# Patient Record
Sex: MALE | Race: WHITE | NOT HISPANIC OR LATINO | Employment: FULL TIME | URBAN - METROPOLITAN AREA
[De-identification: names, ages, dates, MRNs, and addresses within clinical notes are randomized per-mention and may not be internally consistent; named-entity substitution may affect disease eponyms.]

---

## 2023-09-26 ENCOUNTER — LAB (OUTPATIENT)
Dept: LAB | Facility: LAB | Age: 43
End: 2023-09-26
Payer: COMMERCIAL

## 2023-09-26 ENCOUNTER — HOSPITAL ENCOUNTER (EMERGENCY)
Age: 43
Discharge: HOME | End: 2023-09-27
Payer: COMMERCIAL

## 2023-09-26 ENCOUNTER — OFFICE VISIT (OUTPATIENT)
Dept: PRIMARY CARE | Facility: CLINIC | Age: 43
End: 2023-09-26
Payer: COMMERCIAL

## 2023-09-26 VITALS
SYSTOLIC BLOOD PRESSURE: 132 MMHG | BODY MASS INDEX: 35.01 KG/M2 | DIASTOLIC BLOOD PRESSURE: 84 MMHG | HEART RATE: 108 BPM | RESPIRATION RATE: 17 BRPM | WEIGHT: 251 LBS | OXYGEN SATURATION: 97 % | TEMPERATURE: 96.5 F

## 2023-09-26 DIAGNOSIS — E11.65 HYPERGLYCEMIA DUE TO DIABETES MELLITUS (HCC): Primary | ICD-10-CM

## 2023-09-26 DIAGNOSIS — E03.9 HYPOTHYROIDISM, UNSPECIFIED TYPE: ICD-10-CM

## 2023-09-26 DIAGNOSIS — Z00.00 HEALTHCARE MAINTENANCE: ICD-10-CM

## 2023-09-26 DIAGNOSIS — E11.9 TYPE 2 DIABETES MELLITUS WITHOUT COMPLICATION, WITHOUT LONG-TERM CURRENT USE OF INSULIN (MULTI): ICD-10-CM

## 2023-09-26 DIAGNOSIS — E78.2 MIXED HYPERLIPIDEMIA: ICD-10-CM

## 2023-09-26 DIAGNOSIS — I10 HYPERTENSION, UNSPECIFIED TYPE: ICD-10-CM

## 2023-09-26 DIAGNOSIS — B37.0 THRUSH: ICD-10-CM

## 2023-09-26 DIAGNOSIS — Z00.00 HEALTHCARE MAINTENANCE: Primary | ICD-10-CM

## 2023-09-26 PROBLEM — E78.5 HYPERLIPEMIA: Status: ACTIVE | Noted: 2023-09-26

## 2023-09-26 PROBLEM — K76.0 FATTY LIVER: Status: ACTIVE | Noted: 2023-09-26

## 2023-09-26 LAB
ALANINE AMINOTRANSFERASE (SGPT) (U/L) IN SER/PLAS: 107 U/L (ref 10–52)
ALBUMIN (G/DL) IN SER/PLAS: 4.6 G/DL (ref 3.4–5)
ALBUMIN SERPL-MCNC: 3.9 G/DL (ref 3.5–4.6)
ALKALINE PHOSPHATASE (U/L) IN SER/PLAS: 64 U/L (ref 33–120)
ALP SERPL-CCNC: 55 U/L (ref 35–104)
ALT SERPL-CCNC: 75 U/L (ref 0–41)
ANION GAP IN SER/PLAS: 27 MMOL/L (ref 10–20)
ANION GAP SERPL CALCULATED.3IONS-SCNC: 13 MEQ/L (ref 9–15)
ASPARTATE AMINOTRANSFERASE (SGOT) (U/L) IN SER/PLAS: 71 U/L (ref 9–39)
AST SERPL-CCNC: 50 U/L (ref 0–40)
BASOPHILS # BLD: 0 K/UL (ref 0–0.2)
BASOPHILS NFR BLD: 0.2 %
BILIRUB SERPL-MCNC: 0.8 MG/DL (ref 0.2–0.7)
BILIRUB UR QL STRIP: NEGATIVE
BILIRUBIN TOTAL (MG/DL) IN SER/PLAS: 1.8 MG/DL (ref 0–1.2)
BUN SERPL-MCNC: 31 MG/DL (ref 6–20)
CALCIUM (MG/DL) IN SER/PLAS: 10.6 MG/DL (ref 8.6–10.3)
CALCIUM SERPL-MCNC: 8.9 MG/DL (ref 8.5–9.9)
CARBON DIOXIDE, TOTAL (MMOL/L) IN SER/PLAS: 14 MMOL/L (ref 21–32)
CHLORIDE (MMOL/L) IN SER/PLAS: 89 MMOL/L (ref 98–107)
CHLORIDE SERPL-SCNC: 91 MEQ/L (ref 95–107)
CHOLESTEROL (MG/DL) IN SER/PLAS: 323 MG/DL (ref 0–199)
CHOLESTEROL IN HDL (MG/DL) IN SER/PLAS: 32.5 MG/DL
CHOLESTEROL/HDL RATIO: 9.9
CHP ED QC CHECK: NORMAL
CLARITY UR: CLEAR
CO2 SERPL-SCNC: 22 MEQ/L (ref 20–31)
COLOR UR: YELLOW
CREAT SERPL-MCNC: 1.06 MG/DL (ref 0.7–1.2)
CREATININE (MG/DL) IN SER/PLAS: 1.46 MG/DL (ref 0.5–1.3)
EOSINOPHIL # BLD: 0.2 K/UL (ref 0–0.7)
EOSINOPHIL NFR BLD: 1.5 %
ERYTHROCYTE DISTRIBUTION WIDTH (RATIO) BY AUTOMATED COUNT: 12.2 % (ref 11.5–14.5)
ERYTHROCYTE MEAN CORPUSCULAR HEMOGLOBIN CONCENTRATION (G/DL) BY AUTOMATED: 34 G/DL (ref 32–36)
ERYTHROCYTE MEAN CORPUSCULAR VOLUME (FL) BY AUTOMATED COUNT: 91 FL (ref 80–100)
ERYTHROCYTE [DISTWIDTH] IN BLOOD BY AUTOMATED COUNT: 11.9 % (ref 11.5–14.5)
ERYTHROCYTES (10*6/UL) IN BLOOD BY AUTOMATED COUNT: 5.93 X10E12/L (ref 4.5–5.9)
GFR MALE: 60 ML/MIN/1.73M2
GLOBULIN SER CALC-MCNC: 2.5 G/DL (ref 2.3–3.5)
GLUCOSE (MG/DL) IN SER/PLAS: 463 MG/DL (ref 74–99)
GLUCOSE BLD-MCNC: 529 MG/DL
GLUCOSE BLD-MCNC: 529 MG/DL (ref 70–99)
GLUCOSE SERPL-MCNC: 519 MG/DL (ref 70–99)
GLUCOSE UR STRIP-MCNC: >=1000 MG/DL
HCT VFR BLD AUTO: 46.2 % (ref 42–52)
HEMATOCRIT (%) IN BLOOD BY AUTOMATED COUNT: 53.8 % (ref 41–52)
HEMOGLOBIN (G/DL) IN BLOOD: 18.3 G/DL (ref 13.5–17.5)
HGB BLD-MCNC: 16.5 G/DL (ref 14–18)
HGB UR QL STRIP: NEGATIVE
KETONES UR STRIP-MCNC: 15 MG/DL
LDL: ABNORMAL MG/DL (ref 0–99)
LEUKOCYTE ESTERASE UR QL STRIP: NEGATIVE
LEUKOCYTES (10*3/UL) IN BLOOD BY AUTOMATED COUNT: 11.7 X10E9/L (ref 4.4–11.3)
LYMPHOCYTES # BLD: 4.5 K/UL (ref 1–4.8)
LYMPHOCYTES NFR BLD: 45 %
MCH RBC QN AUTO: 31 PG (ref 27–31.3)
MCHC RBC AUTO-ENTMCNC: 35.7 % (ref 33–37)
MCV RBC AUTO: 86.7 FL (ref 79–92.2)
MONOCYTES # BLD: 0.6 K/UL (ref 0.2–0.8)
MONOCYTES NFR BLD: 6.2 %
NEUTROPHILS # BLD: 4.6 K/UL (ref 1.4–6.5)
NEUTS SEG NFR BLD: 46.2 %
NITRITE UR QL STRIP: NEGATIVE
PERFORMED ON: ABNORMAL
PH UR STRIP: 5 [PH] (ref 5–9)
PLATELET # BLD AUTO: 279 K/UL (ref 130–400)
PLATELETS (10*3/UL) IN BLOOD AUTOMATED COUNT: 304 X10E9/L (ref 150–450)
POTASSIUM (MMOL/L) IN SER/PLAS: 5.1 MMOL/L (ref 3.5–5.3)
POTASSIUM SERPL-SCNC: 5.3 MEQ/L (ref 3.4–4.9)
PROT SERPL-MCNC: 6.4 G/DL (ref 6.3–8)
PROT UR STRIP-MCNC: ABNORMAL MG/DL
PROTEIN TOTAL: 7.8 G/DL (ref 6.4–8.2)
RBC # BLD AUTO: 5.33 M/UL (ref 4.7–6.1)
REASON FOR REJECTION: NORMAL
REJECTED TEST: NORMAL
SODIUM (MMOL/L) IN SER/PLAS: 125 MMOL/L (ref 136–145)
SODIUM SERPL-SCNC: 126 MEQ/L (ref 135–144)
SP GR UR STRIP: 1.03 (ref 1–1.03)
THYROTROPIN (MIU/L) IN SER/PLAS BY DETECTION LIMIT <= 0.05 MIU/L: 4.76 MIU/L (ref 0.44–3.98)
THYROXINE (T4) FREE (NG/DL) IN SER/PLAS: 1.21 NG/DL (ref 0.61–1.12)
TRIGLYCERIDE (MG/DL) IN SER/PLAS: 1277 MG/DL (ref 0–149)
UREA NITROGEN (MG/DL) IN SER/PLAS: 29 MG/DL (ref 6–23)
URINE REFLEX TO CULTURE: ABNORMAL
UROBILINOGEN UR STRIP-ACNC: 0.2 E.U./DL
VLDL: ABNORMAL MG/DL (ref 0–40)
WBC # BLD AUTO: 9.9 K/UL (ref 4.8–10.8)

## 2023-09-26 PROCEDURE — 36415 COLL VENOUS BLD VENIPUNCTURE: CPT

## 2023-09-26 PROCEDURE — 3075F SYST BP GE 130 - 139MM HG: CPT | Performed by: FAMILY MEDICINE

## 2023-09-26 PROCEDURE — 84439 ASSAY OF FREE THYROXINE: CPT

## 2023-09-26 PROCEDURE — 85027 COMPLETE CBC AUTOMATED: CPT

## 2023-09-26 PROCEDURE — 96372 THER/PROPH/DIAG INJ SC/IM: CPT

## 2023-09-26 PROCEDURE — 3079F DIAST BP 80-89 MM HG: CPT | Performed by: FAMILY MEDICINE

## 2023-09-26 PROCEDURE — 80053 COMPREHEN METABOLIC PANEL: CPT

## 2023-09-26 PROCEDURE — 99284 EMERGENCY DEPT VISIT MOD MDM: CPT

## 2023-09-26 PROCEDURE — 84443 ASSAY THYROID STIM HORMONE: CPT

## 2023-09-26 PROCEDURE — 99396 PREV VISIT EST AGE 40-64: CPT | Performed by: FAMILY MEDICINE

## 2023-09-26 PROCEDURE — 81003 URINALYSIS AUTO W/O SCOPE: CPT

## 2023-09-26 PROCEDURE — 83721 ASSAY OF BLOOD LIPOPROTEIN: CPT

## 2023-09-26 PROCEDURE — 80061 LIPID PANEL: CPT

## 2023-09-26 PROCEDURE — 2580000003 HC RX 258

## 2023-09-26 PROCEDURE — 85025 COMPLETE CBC W/AUTO DIFF WBC: CPT

## 2023-09-26 PROCEDURE — 4010F ACE/ARB THERAPY RXD/TAKEN: CPT | Performed by: FAMILY MEDICINE

## 2023-09-26 RX ORDER — METFORMIN HYDROCHLORIDE 500 MG/1
500 TABLET ORAL 2 TIMES DAILY
COMMUNITY
End: 2023-10-06 | Stop reason: SDUPTHER

## 2023-09-26 RX ORDER — 0.9 % SODIUM CHLORIDE 0.9 %
2000 INTRAVENOUS SOLUTION INTRAVENOUS ONCE
Status: COMPLETED | OUTPATIENT
Start: 2023-09-26 | End: 2023-09-27

## 2023-09-26 RX ORDER — INSULIN DEGLUDEC 100 U/ML
10 INJECTION, SOLUTION SUBCUTANEOUS NIGHTLY
Qty: 3 ML | Refills: 1 | COMMUNITY
Start: 2023-09-26

## 2023-09-26 RX ORDER — INSULIN LISPRO 100 [IU]/ML
10 INJECTION, SOLUTION INTRAVENOUS; SUBCUTANEOUS ONCE
Status: COMPLETED | OUTPATIENT
Start: 2023-09-26 | End: 2023-09-27

## 2023-09-26 RX ORDER — NYSTATIN 100000 [USP'U]/ML
5 SUSPENSION ORAL 4 TIMES DAILY
Qty: 200 ML | Refills: 0 | Status: SHIPPED | OUTPATIENT
Start: 2023-09-26

## 2023-09-26 RX ORDER — LISINOPRIL 30 MG/1
30 TABLET ORAL DAILY
COMMUNITY
End: 2023-10-06 | Stop reason: SDUPTHER

## 2023-09-26 RX ORDER — INSULIN PUMP SYRINGE, 3 ML
EACH MISCELLANEOUS
Qty: 1 EACH | Refills: 0 | Status: SHIPPED | OUTPATIENT
Start: 2023-09-26 | End: 2024-09-25

## 2023-09-26 RX ORDER — INSULIN LISPRO-AABC 100 [IU]/ML
2 INJECTION, SOLUTION SUBCUTANEOUS 2 TIMES DAILY
Qty: 1 ML | Refills: 0 | COMMUNITY
Start: 2023-09-26 | End: 2023-11-03 | Stop reason: SDUPTHER

## 2023-09-26 RX ADMIN — SODIUM CHLORIDE 2000 ML: 9 INJECTION, SOLUTION INTRAVENOUS at 21:47

## 2023-09-26 ASSESSMENT — ENCOUNTER SYMPTOMS
SHORTNESS OF BREATH: 0
VOMITING: 0
PHOTOPHOBIA: 0
RESPIRATORY NEGATIVE: 1
PSYCHIATRIC NEGATIVE: 1
ABDOMINAL PAIN: 0
DIARRHEA: 0
CARDIOVASCULAR NEGATIVE: 1
COUGH: 0
NUMBNESS: 0
CONSTITUTIONAL NEGATIVE: 1
HEMATOLOGIC/LYMPHATIC NEGATIVE: 1
MUSCULOSKELETAL NEGATIVE: 1
DIZZINESS: 1
GASTROINTESTINAL NEGATIVE: 1
NAUSEA: 0

## 2023-09-26 ASSESSMENT — PAIN - FUNCTIONAL ASSESSMENT: PAIN_FUNCTIONAL_ASSESSMENT: NONE - DENIES PAIN

## 2023-09-26 ASSESSMENT — LIFESTYLE VARIABLES
HOW MANY STANDARD DRINKS CONTAINING ALCOHOL DO YOU HAVE ON A TYPICAL DAY: PATIENT DOES NOT DRINK
HOW OFTEN DO YOU HAVE A DRINK CONTAINING ALCOHOL: NEVER

## 2023-09-26 NOTE — PROGRESS NOTES
Subjective   Patient ID: Carrington eMad is a 43 y.o. male who presents for Follow-up.    Patient states he has had no chest pain or shortness of breath.  No diaphoresis.  He seems to be peeing more.  Normal bowel bladder patient states at times he feels dizzy.  He is lost close to 50 pounds.  He has been watching what he drinks.  Has been avoiding alcohol.  However now if he bends down to tie shoes and stands up he gets a little dizzy.  His blood pressure was running high.  He cut his dose in half.  This is helped with his blood pressures reading.  But he still feels occasional dizziness.  He has not used his CPAP in years.  At times he feels like he may have palpitations.  No leg swelling, his glucose numbers seemed high so he started doubling up his metformin.  He just feels a little more fatigued.  Patient's symptoms have been going on since early August.  He remembers going on a trip to [place] and needing to stop to pee a lot       Review of Systems   Constitutional: Negative.    HENT: Negative.     Respiratory: Negative.     Cardiovascular: Negative.    Gastrointestinal: Negative.    Genitourinary: Negative.    Musculoskeletal: Negative.    Neurological:  Positive for dizziness. Negative for numbness.   Hematological: Negative.    Psychiatric/Behavioral: Negative.         Objective   /84 (BP Location: Left arm, Patient Position: Sitting, BP Cuff Size: Large adult)   Pulse 108   Temp 35.8 °C (96.5 °F)   Resp 17   Wt 114 kg (251 lb)   SpO2 97%   BMI 35.01 kg/m²     Physical Exam  Vitals and nursing note reviewed.   Constitutional:       General: He is not in acute distress.     Appearance: Normal appearance. He is not ill-appearing.   HENT:      Head: Normocephalic.      Right Ear: Tympanic membrane and ear canal normal.      Left Ear: Tympanic membrane and ear canal normal.      Nose: Nose normal.      Mouth/Throat:      Mouth: Mucous membranes are moist.      Pharynx: Oropharynx is clear.       Comments: Patient has a whitish coating on his tongue.  Possible thrush  Eyes:      Extraocular Movements: Extraocular movements intact.      Conjunctiva/sclera: Conjunctivae normal.      Pupils: Pupils are equal, round, and reactive to light.   Cardiovascular:      Rate and Rhythm: Normal rate and regular rhythm.      Pulses: Normal pulses.   Pulmonary:      Effort: Pulmonary effort is normal. No respiratory distress.      Breath sounds: No wheezing, rhonchi or rales.   Abdominal:      General: Bowel sounds are normal.      Palpations: Abdomen is soft.      Tenderness: There is no guarding.      Hernia: No hernia is present.   Musculoskeletal:         General: Normal range of motion.      Cervical back: Neck supple.      Right lower leg: No edema.      Left lower leg: No edema.   Skin:     General: Skin is warm.      Capillary Refill: Capillary refill takes less than 2 seconds.   Neurological:      General: No focal deficit present.      Mental Status: He is oriented to person, place, and time.      Cranial Nerves: No cranial nerve deficit.      Sensory: No sensory deficit.      Gait: Gait normal.      Deep Tendon Reflexes: Reflexes normal.   Psychiatric:         Mood and Affect: Mood normal.         Behavior: Behavior normal.         Judgment: Judgment normal.         Assessment/Plan   Problem List Items Addressed This Visit       Hypertension    Hyperlipemia    Hypothyroidism    Relevant Orders    TSH with reflex to Free T4 if abnormal     Other Visit Diagnoses       Healthcare maintenance    -  Primary    Relevant Orders    CBC    Comprehensive Metabolic Panel    Lipid Panel          Patient's EKG showed normal sinus rhythm.  He did have PVCs.  However patient's Accu-Chek he was over 400. HGA1c 14.9  [facility]  If he is blood work does not show any acidosis we will have him also start the Farxiga as long as his kidney function is fine.  Patient states he has not checked his glucose in some  time.  He has not been eating well.  Drink a lot of orange juice.  Apple juice.  His kidney function is normal we will add Farxiga.  Still may need injectable.  He does not monitor his glucose over the next few days to let us know results.  I will send a new meter in.  Patient aware of importance to his diet, monitor his glucose.  Demonstrated to the patient how to do the injection.  2 units were given in the office.  We will get the blood work we need to make sure the patient is not in DKA.  Per patient today he does not feel that bad.  He is felt a lot worse.  He has been drinking more fluids.  He states he has been taking his medications so he can continue these.  We will check the blood work.  Patient aware if any chest pain shortness of breath nausea vomiting diarrhea fever or if the numbers stay above 300 he is notify the office.  We will adjust both medications until we have this under control.  Then at that time we may switch it.  Follow-up in 1 month depending on results.  Patient's urine did have a large amount of glucose.  Did have a small amount of ketones however patient had not eaten all day.  We will await the blood work 2000 glucose, some protein and small amount of ketones

## 2023-09-27 VITALS
BODY MASS INDEX: 35.65 KG/M2 | DIASTOLIC BLOOD PRESSURE: 95 MMHG | WEIGHT: 249 LBS | HEIGHT: 70 IN | HEART RATE: 95 BPM | TEMPERATURE: 98.9 F | OXYGEN SATURATION: 97 % | RESPIRATION RATE: 18 BRPM | SYSTOLIC BLOOD PRESSURE: 129 MMHG

## 2023-09-27 LAB
CHOLESTEROL IN LDL (MG/DL) IN SER/PLAS BY DIRECT ASSAY: 100 MG/DL (ref 0–129)
CHP ED QC CHECK: NORMAL
GLUCOSE BLD-MCNC: 425 MG/DL
GLUCOSE BLD-MCNC: 425 MG/DL (ref 70–99)
PERFORMED ON: ABNORMAL

## 2023-09-27 PROCEDURE — 96372 THER/PROPH/DIAG INJ SC/IM: CPT

## 2023-09-27 PROCEDURE — 6370000000 HC RX 637 (ALT 250 FOR IP)

## 2023-09-27 RX ADMIN — INSULIN LISPRO 10 UNITS: 100 INJECTION, SOLUTION INTRAVENOUS; SUBCUTANEOUS at 00:01

## 2023-09-27 NOTE — ED PROVIDER NOTES
[facility] ED  EMERGENCY DEPARTMENT ENCOUNTER      Pt Name: Abril Reynolds  MRN: [id number]  [address] [facility] [place] 1980  Date of evaluation: 9/26/2023  Provider: ROSA M Beth    CHIEF COMPLAINT       Chief Complaint   Patient presents with    Hyperglycemia     Patient states he has been feeling ill for approx 1mo. Blood sugar has been running >400mg/dL. Tonight BGL >5oomg/dL. Sent by PCP. HISTORY OF PRESENT ILLNESS   (Location/Symptom, Timing/Onset, Context/Setting, Quality, Duration, Modifying Factors, Severity)  Note limiting factors. Abril Reynolds is a 39 y.o. male who presents to the emergency department patient PMHx T2Dm, pt presents to the ED for evaluation of elevated blood sugar. This was noticed at his doctor's office earlier today. States his blood sugar reading there was over 500. Patient states he has history of diabetes he is typically on 500 mg metformin twice daily. Admits he does not check his sugar daily before today he is not sure when the last time his blood sugar was checked. Patient states he has been feeling very thirsty, losing weight, having frequent urination, for the past 2 months. Patient states while he was in the office today his doctor started him on a second diabetes medicine he is not sure what it was. His A1c there was 14. Patient denies any history of DKA complications. Denies any recent nausea, vomiting, decreased oral intake. Admits to poor dietary compliance and frequent fast food. HPI    Nursing Notes were reviewed. REVIEW OF SYSTEMS    (2-9 systems for level 4, 10 or more for level 5)     Review of Systems   Constitutional:  Positive for unexpected weight change. Negative for chills and fever. HENT:  Negative for congestion. Eyes:  Negative for photophobia. Respiratory:  Negative for cough and shortness of breath. Cardiovascular:  Negative for chest pain.    Gastrointestinal:  Negative for abdominal pain, diarrhea, nausea

## 2023-09-27 NOTE — RESULT ENCOUNTER NOTE
Patient has seen the results.  He actually is at the emergency room to get fluids treatment for his hyponatremia.  He will follow-up to discuss his cholesterol

## 2023-10-03 ENCOUNTER — APPOINTMENT (OUTPATIENT)
Dept: PRIMARY CARE | Facility: CLINIC | Age: 43
End: 2023-10-03
Payer: COMMERCIAL

## 2023-10-06 DIAGNOSIS — E11.9 TYPE 2 DIABETES MELLITUS WITHOUT COMPLICATION, WITHOUT LONG-TERM CURRENT USE OF INSULIN (MULTI): Primary | ICD-10-CM

## 2023-10-06 DIAGNOSIS — I10 HYPERTENSION, UNSPECIFIED TYPE: ICD-10-CM

## 2023-10-06 RX ORDER — LISINOPRIL 30 MG/1
30 TABLET ORAL DAILY
Qty: 30 TABLET | Refills: 3 | Status: SHIPPED | OUTPATIENT
Start: 2023-10-06 | End: 2024-01-30

## 2023-10-06 RX ORDER — METFORMIN HYDROCHLORIDE 1000 MG/1
1000 TABLET ORAL 2 TIMES DAILY
Qty: 60 TABLET | Refills: 3 | Status: SHIPPED | OUTPATIENT
Start: 2023-10-06 | End: 2024-02-09

## 2023-10-13 ENCOUNTER — OFFICE VISIT (OUTPATIENT)
Dept: PRIMARY CARE | Facility: CLINIC | Age: 43
End: 2023-10-13
Payer: COMMERCIAL

## 2023-10-13 VITALS
BODY MASS INDEX: 36.26 KG/M2 | HEART RATE: 77 BPM | RESPIRATION RATE: 17 BRPM | DIASTOLIC BLOOD PRESSURE: 78 MMHG | TEMPERATURE: 97.6 F | WEIGHT: 260 LBS | SYSTOLIC BLOOD PRESSURE: 122 MMHG | OXYGEN SATURATION: 96 %

## 2023-10-13 DIAGNOSIS — I10 HYPERTENSION, UNSPECIFIED TYPE: Primary | ICD-10-CM

## 2023-10-13 DIAGNOSIS — E11.9 TYPE 2 DIABETES MELLITUS WITHOUT COMPLICATION, WITHOUT LONG-TERM CURRENT USE OF INSULIN (MULTI): ICD-10-CM

## 2023-10-13 PROCEDURE — 3074F SYST BP LT 130 MM HG: CPT | Performed by: FAMILY MEDICINE

## 2023-10-13 PROCEDURE — 99213 OFFICE O/P EST LOW 20 MIN: CPT | Performed by: FAMILY MEDICINE

## 2023-10-13 PROCEDURE — 3078F DIAST BP <80 MM HG: CPT | Performed by: FAMILY MEDICINE

## 2023-10-13 PROCEDURE — 4010F ACE/ARB THERAPY RXD/TAKEN: CPT | Performed by: FAMILY MEDICINE

## 2023-10-13 ASSESSMENT — ENCOUNTER SYMPTOMS
CONSTITUTIONAL NEGATIVE: 1
CARDIOVASCULAR NEGATIVE: 1
RESPIRATORY NEGATIVE: 1
GASTROINTESTINAL NEGATIVE: 1

## 2023-10-13 NOTE — PROGRESS NOTES
PatientSubjective   Patient ID: Carrington Mead is a 43 y.o. male who presents for Follow-up.    Patient is doing a lot better.  He feels a lot better.  No chest pain or shortness of breath.  He is tolerating his medications.  He was taking his Crestor but not every day.  He show start taking the Livalo daily.  Patient glucose this morning was 180.  Yesterday after dinner is 142.  He is doing 14 units in the evening of the Tresiba, and 2 units with meals.  He is can adjust the short acting to 2 to 4 units.  We will increase the Tresiba by 2 units on Sunday.  Patient is hydrating well.  He did put on weight but he is starting to try to increase his physical activity         Review of Systems   Constitutional: Negative.    Respiratory: Negative.     Cardiovascular: Negative.    Gastrointestinal: Negative.    Genitourinary: Negative.        Objective   /78 (BP Location: Left arm, Patient Position: Sitting, BP Cuff Size: Large adult)   Pulse 77   Temp 36.4 °C (97.6 °F)   Resp 17   Wt 118 kg (260 lb)   SpO2 96%   BMI 36.26 kg/m²     Physical Exam  Constitutional:       Appearance: Normal appearance.   Cardiovascular:      Rate and Rhythm: Normal rate and regular rhythm.   Pulmonary:      Effort: Pulmonary effort is normal.      Breath sounds: Normal breath sounds.   Neurological:      Mental Status: He is alert.      Sensory: No sensory deficit.         Assessment/Plan   Problem List Items Addressed This Visit       Hypertension - Primary     We will continue medication.  Patient is going to continue to update us with his numbers.  We will restart the Livalo.  We will recheck his sodium and creatinine.  Patient is aware if his numbers are increasing, any concerning symptoms such as chest pain or shortness of breath, any numbness tingling notify the office or go to the ER  Follow-up in 2 months

## 2023-11-03 DIAGNOSIS — E11.9 TYPE 2 DIABETES MELLITUS WITHOUT COMPLICATION, WITHOUT LONG-TERM CURRENT USE OF INSULIN (MULTI): ICD-10-CM

## 2023-11-03 RX ORDER — INSULIN LISPRO 100 [IU]/ML
4 INJECTION, SOLUTION INTRAVENOUS; SUBCUTANEOUS
Qty: 10 ML | Refills: 2 | Status: SHIPPED | OUTPATIENT
Start: 2023-11-03 | End: 2023-11-07

## 2023-11-07 DIAGNOSIS — E11.9 TYPE 2 DIABETES MELLITUS WITHOUT COMPLICATION, WITHOUT LONG-TERM CURRENT USE OF INSULIN (MULTI): Primary | ICD-10-CM

## 2023-11-07 RX ORDER — INSULIN LISPRO 200 [IU]/ML
4 INJECTION, SOLUTION SUBCUTANEOUS 2 TIMES DAILY
Qty: 1 EACH | Refills: 2 | Status: SHIPPED | OUTPATIENT
Start: 2023-11-07

## 2024-01-05 ENCOUNTER — TELEPHONE (OUTPATIENT)
Dept: PRIMARY CARE | Facility: CLINIC | Age: 44
End: 2024-01-05
Payer: COMMERCIAL

## 2024-01-30 DIAGNOSIS — I10 HYPERTENSION, UNSPECIFIED TYPE: ICD-10-CM

## 2024-01-30 RX ORDER — LISINOPRIL 30 MG/1
30 TABLET ORAL DAILY
Qty: 30 TABLET | Refills: 3 | Status: SHIPPED | OUTPATIENT
Start: 2024-01-30 | End: 2024-02-09 | Stop reason: SDUPTHER

## 2024-02-08 DIAGNOSIS — Z00.00 HEALTHCARE MAINTENANCE: ICD-10-CM

## 2024-02-08 DIAGNOSIS — E11.9 TYPE 2 DIABETES MELLITUS WITHOUT COMPLICATION, WITHOUT LONG-TERM CURRENT USE OF INSULIN (MULTI): ICD-10-CM

## 2024-02-09 DIAGNOSIS — I10 HYPERTENSION, UNSPECIFIED TYPE: ICD-10-CM

## 2024-02-09 RX ORDER — METFORMIN HYDROCHLORIDE 1000 MG/1
1000 TABLET ORAL 2 TIMES DAILY
Qty: 60 TABLET | Refills: 5 | Status: SHIPPED | OUTPATIENT
Start: 2024-02-09

## 2024-02-09 RX ORDER — LISINOPRIL 30 MG/1
30 TABLET ORAL DAILY
Qty: 30 TABLET | Refills: 5 | Status: SHIPPED | OUTPATIENT
Start: 2024-02-09

## 2024-02-09 RX ORDER — LEVOTHYROXINE SODIUM 125 UG/1
TABLET ORAL
Qty: 30 TABLET | Refills: 5 | Status: SHIPPED | OUTPATIENT
Start: 2024-02-09

## 2024-07-24 DIAGNOSIS — E11.9 TYPE 2 DIABETES MELLITUS WITHOUT COMPLICATION, WITHOUT LONG-TERM CURRENT USE OF INSULIN (MULTI): Primary | ICD-10-CM

## 2024-08-02 DIAGNOSIS — Z00.00 HEALTHCARE MAINTENANCE: ICD-10-CM

## 2024-08-03 RX ORDER — LEVOTHYROXINE SODIUM 125 UG/1
TABLET ORAL
Qty: 90 TABLET | Refills: 1 | Status: SHIPPED | OUTPATIENT
Start: 2024-08-03

## 2024-08-07 DIAGNOSIS — E11.9 TYPE 2 DIABETES MELLITUS WITHOUT COMPLICATION, WITHOUT LONG-TERM CURRENT USE OF INSULIN (MULTI): ICD-10-CM

## 2024-08-08 RX ORDER — METFORMIN HYDROCHLORIDE 1000 MG/1
1000 TABLET ORAL 2 TIMES DAILY
Qty: 60 TABLET | Refills: 3 | Status: SHIPPED | OUTPATIENT
Start: 2024-08-08

## 2024-08-15 DIAGNOSIS — E11.9 TYPE 2 DIABETES MELLITUS WITHOUT COMPLICATION, WITHOUT LONG-TERM CURRENT USE OF INSULIN (MULTI): ICD-10-CM

## 2024-08-15 DIAGNOSIS — Z00.00 HEALTHCARE MAINTENANCE: Primary | ICD-10-CM

## 2024-08-16 ENCOUNTER — LAB (OUTPATIENT)
Dept: LAB | Facility: LAB | Age: 44
End: 2024-08-16
Payer: COMMERCIAL

## 2024-08-16 DIAGNOSIS — Z00.00 HEALTHCARE MAINTENANCE: ICD-10-CM

## 2024-08-16 DIAGNOSIS — E11.9 TYPE 2 DIABETES MELLITUS WITHOUT COMPLICATION, WITHOUT LONG-TERM CURRENT USE OF INSULIN (MULTI): ICD-10-CM

## 2024-08-16 LAB
ALBUMIN SERPL BCP-MCNC: 4.5 G/DL (ref 3.4–5)
ALP SERPL-CCNC: 51 U/L (ref 33–120)
ALT SERPL W P-5'-P-CCNC: 124 U/L (ref 10–52)
ANION GAP SERPL CALC-SCNC: 14 MMOL/L (ref 10–20)
AST SERPL W P-5'-P-CCNC: 76 U/L (ref 9–39)
BILIRUB SERPL-MCNC: 1.8 MG/DL (ref 0–1.2)
BUN SERPL-MCNC: 22 MG/DL (ref 6–23)
CALCIUM SERPL-MCNC: 10.1 MG/DL (ref 8.6–10.3)
CHLORIDE SERPL-SCNC: 103 MMOL/L (ref 98–107)
CHOLEST SERPL-MCNC: 199 MG/DL (ref 0–199)
CHOLESTEROL/HDL RATIO: 5.1
CO2 SERPL-SCNC: 24 MMOL/L (ref 21–32)
CREAT SERPL-MCNC: 1.08 MG/DL (ref 0.5–1.3)
EGFRCR SERPLBLD CKD-EPI 2021: 86 ML/MIN/1.73M*2
ERYTHROCYTE [DISTWIDTH] IN BLOOD BY AUTOMATED COUNT: 12.2 % (ref 11.5–14.5)
EST. AVERAGE GLUCOSE BLD GHB EST-MCNC: 180 MG/DL
GLUCOSE SERPL-MCNC: 166 MG/DL (ref 74–99)
HBA1C MFR BLD: 7.9 %
HCT VFR BLD AUTO: 46.9 % (ref 41–52)
HDLC SERPL-MCNC: 39.1 MG/DL
HGB BLD-MCNC: 16.5 G/DL (ref 13.5–17.5)
LDLC SERPL CALC-MCNC: ABNORMAL MG/DL
MCH RBC QN AUTO: 31.1 PG (ref 26–34)
MCHC RBC AUTO-ENTMCNC: 35.2 G/DL (ref 32–36)
MCV RBC AUTO: 89 FL (ref 80–100)
NON HDL CHOLESTEROL: 160 MG/DL (ref 0–149)
NRBC BLD-RTO: 0 /100 WBCS (ref 0–0)
PLATELET # BLD AUTO: 236 X10*3/UL (ref 150–450)
POTASSIUM SERPL-SCNC: 4.4 MMOL/L (ref 3.5–5.3)
PROT SERPL-MCNC: 7.3 G/DL (ref 6.4–8.2)
RBC # BLD AUTO: 5.3 X10*6/UL (ref 4.5–5.9)
SODIUM SERPL-SCNC: 137 MMOL/L (ref 136–145)
TRIGL SERPL-MCNC: 419 MG/DL (ref 0–149)
VLDL: ABNORMAL
WBC # BLD AUTO: 8.3 X10*3/UL (ref 4.4–11.3)

## 2024-08-16 PROCEDURE — 36415 COLL VENOUS BLD VENIPUNCTURE: CPT

## 2024-08-16 PROCEDURE — 83036 HEMOGLOBIN GLYCOSYLATED A1C: CPT

## 2024-08-16 PROCEDURE — 80053 COMPREHEN METABOLIC PANEL: CPT

## 2024-08-16 PROCEDURE — 85027 COMPLETE CBC AUTOMATED: CPT

## 2024-08-16 PROCEDURE — 80061 LIPID PANEL: CPT

## 2024-08-17 DIAGNOSIS — E11.9 TYPE 2 DIABETES MELLITUS WITHOUT COMPLICATION, WITHOUT LONG-TERM CURRENT USE OF INSULIN (MULTI): ICD-10-CM

## 2024-08-17 RX ORDER — INSULIN DEGLUDEC 100 U/ML
10 INJECTION, SOLUTION SUBCUTANEOUS NIGHTLY
Qty: 3 ML | Refills: 1 | Status: SHIPPED | OUTPATIENT
Start: 2024-08-17

## 2024-08-19 DIAGNOSIS — E11.9 TYPE 2 DIABETES MELLITUS WITHOUT COMPLICATION, WITHOUT LONG-TERM CURRENT USE OF INSULIN (MULTI): Primary | ICD-10-CM

## 2024-08-19 RX ORDER — INSULIN GLARGINE 100 [IU]/ML
10 INJECTION, SOLUTION SUBCUTANEOUS NIGHTLY
Qty: 1 EACH | Refills: 2 | Status: SHIPPED | OUTPATIENT
Start: 2024-08-19

## 2024-08-27 ENCOUNTER — OFFICE VISIT (OUTPATIENT)
Dept: PRIMARY CARE | Facility: CLINIC | Age: 44
End: 2024-08-27
Payer: COMMERCIAL

## 2024-08-27 VITALS
TEMPERATURE: 97 F | SYSTOLIC BLOOD PRESSURE: 152 MMHG | DIASTOLIC BLOOD PRESSURE: 110 MMHG | BODY MASS INDEX: 38.78 KG/M2 | WEIGHT: 277 LBS | OXYGEN SATURATION: 97 % | RESPIRATION RATE: 16 BRPM | HEIGHT: 71 IN | HEART RATE: 72 BPM

## 2024-08-27 DIAGNOSIS — E11.9 TYPE 2 DIABETES MELLITUS WITHOUT COMPLICATION, WITHOUT LONG-TERM CURRENT USE OF INSULIN (MULTI): Primary | ICD-10-CM

## 2024-08-27 DIAGNOSIS — E78.2 MIXED HYPERLIPIDEMIA: ICD-10-CM

## 2024-08-27 DIAGNOSIS — I10 HYPERTENSION, UNSPECIFIED TYPE: ICD-10-CM

## 2024-08-27 PROCEDURE — 1036F TOBACCO NON-USER: CPT | Performed by: FAMILY MEDICINE

## 2024-08-27 PROCEDURE — 3080F DIAST BP >= 90 MM HG: CPT | Performed by: FAMILY MEDICINE

## 2024-08-27 PROCEDURE — 3051F HG A1C>EQUAL 7.0%<8.0%: CPT | Performed by: FAMILY MEDICINE

## 2024-08-27 PROCEDURE — 3008F BODY MASS INDEX DOCD: CPT | Performed by: FAMILY MEDICINE

## 2024-08-27 PROCEDURE — 3077F SYST BP >= 140 MM HG: CPT | Performed by: FAMILY MEDICINE

## 2024-08-27 PROCEDURE — 4010F ACE/ARB THERAPY RXD/TAKEN: CPT | Performed by: FAMILY MEDICINE

## 2024-08-27 PROCEDURE — 99213 OFFICE O/P EST LOW 20 MIN: CPT | Performed by: FAMILY MEDICINE

## 2024-08-27 RX ORDER — LISINOPRIL 40 MG/1
40 TABLET ORAL DAILY
Qty: 90 TABLET | Refills: 1 | Status: SHIPPED | OUTPATIENT
Start: 2024-08-27

## 2024-08-27 RX ORDER — PITAVASTATIN CALCIUM 2.09 MG/1
2 TABLET, FILM COATED ORAL DAILY
Qty: 30 TABLET | Refills: 2 | Status: SHIPPED | OUTPATIENT
Start: 2024-08-27

## 2024-08-27 RX ORDER — INSULIN GLARGINE-YFGN 100 [IU]/ML
10 INJECTION, SOLUTION SUBCUTANEOUS NIGHTLY
COMMUNITY
Start: 2024-08-19

## 2024-08-27 RX ORDER — INSULIN LISPRO 200 [IU]/ML
4 INJECTION, SOLUTION SUBCUTANEOUS 2 TIMES DAILY
Qty: 1 EACH | Refills: 2 | Status: SHIPPED | OUTPATIENT
Start: 2024-08-27

## 2024-08-27 ASSESSMENT — ENCOUNTER SYMPTOMS
CARDIOVASCULAR NEGATIVE: 1
CONSTITUTIONAL NEGATIVE: 1
GASTROINTESTINAL NEGATIVE: 1
RESPIRATORY NEGATIVE: 1

## 2024-08-27 NOTE — PROGRESS NOTES
"Subjective   Patient ID: Carrington Mead is a 44 y.o. male who presents for Follow-up.    Patient is having no chest pain or shortness of breath.  He is taken no long-acting insulin and metformin.  He did not take the short acting.  This was working better he was losing weight.  Discussed Mounjaro with the patient and he wants to try this in October.  Patient stated that his numbers are probably little high.  His blood pressures been little high.  He has not been eating well or drinking well.  He has maintained his weight.  No leg swelling, no side effects of medication         Review of Systems   Constitutional: Negative.    Respiratory: Negative.     Cardiovascular: Negative.    Gastrointestinal: Negative.    Genitourinary: Negative.        Objective   BP (!) 152/110 (BP Location: Right arm, Patient Position: Sitting, BP Cuff Size: Large adult)   Pulse 72   Temp 36.1 °C (97 °F)   Resp 16   Ht 1.803 m (5' 11\")   Wt 126 kg (277 lb)   SpO2 97%   BMI 38.63 kg/m²     Physical Exam  Vitals reviewed.   Constitutional:       General: He is not in acute distress.     Appearance: Normal appearance. He is well-developed.   HENT:      Head: Normocephalic.   Cardiovascular:      Rate and Rhythm: Normal rate and regular rhythm.      Heart sounds: Normal heart sounds.   Pulmonary:      Effort: Pulmonary effort is normal.      Breath sounds: Normal breath sounds.   Skin:     Findings: No rash.   Neurological:      Mental Status: He is alert.   Psychiatric:         Mood and Affect: Mood normal.         Behavior: Behavior normal.         Assessment/Plan   Problem List Items Addressed This Visit       Hyperlipemia    Type 2 diabetes mellitus (Multi) - Primary     Patient is to speak with [facility] pharmacy to see if they can help him.  Will try short acting along with long-acting.  When he is to start the Mounjaro we will stop this.  Patient blood pressure still running high.  Would increase his lisinopril.  He needs to watch his " diet try to get physical activity in.  May need to add hydrochlorothiazide.  He is can check his blood pressure over the next few days  Went over the results.  Patient's liver enzymes are still elevated.  He has a history of fatty liver.  No fever should get an ultrasound.  He he does not want to do this at this time.  May need to see GI.  Patient aware if any chest pain shortness of breath any nausea vomiting diarrhea fever headache any concerning symptoms go to ER  Follow-up in 3 months.

## 2024-09-04 DIAGNOSIS — E11.9 TYPE 2 DIABETES MELLITUS WITHOUT COMPLICATION, WITHOUT LONG-TERM CURRENT USE OF INSULIN (MULTI): Primary | ICD-10-CM

## 2024-09-04 RX ORDER — INSULIN ASPART 100 [IU]/ML
2 INJECTION, SOLUTION INTRAVENOUS; SUBCUTANEOUS
Qty: 3 ML | Refills: 1 | Status: SHIPPED | OUTPATIENT
Start: 2024-09-04

## 2024-09-10 ENCOUNTER — APPOINTMENT (OUTPATIENT)
Dept: PHARMACY | Facility: HOSPITAL | Age: 44
End: 2024-09-10
Payer: COMMERCIAL

## 2024-09-10 DIAGNOSIS — E11.9 TYPE 2 DIABETES MELLITUS WITHOUT COMPLICATION, WITHOUT LONG-TERM CURRENT USE OF INSULIN (MULTI): ICD-10-CM

## 2024-09-10 RX ORDER — TIRZEPATIDE 5 MG/.5ML
5 INJECTION, SOLUTION SUBCUTANEOUS
Qty: 2 ML | Refills: 0 | Status: SHIPPED | OUTPATIENT
Start: 2024-09-10

## 2024-09-10 NOTE — PROGRESS NOTES
Subjective   Patient ID: Carrington Mead is a 44 y.o. male who presents for Follow-up (Mounjaro help). Starting mounjaro at end of month; has sample in hand. Unable to run test claim at  at this time. Does not qualify for PAP at .         Assessment/Plan   Problem List Items Addressed This Visit       Type 2 diabetes mellitus (Multi)    Relevant Medications    tirzepatide (Mounjaro) 5 mg/0.5 mL pen injector    Other Relevant Orders    Follow Up In [facility] - Pharmacy       LAB RESULTS:  Lab Results   Component Value Date    HGBA1C 7.9 (H) 08/16/2024     Lab Results   Component Value Date    LDLCALC  08/16/2024      Comment:      The calculation of LDL and VLDL are inaccurate when the Triglycerides are greater than 400 mg/dL or when the patient is non-fasting. If LDL measurement is necessary contact the testing laboratory for an alternative LDL assay.                                  Near   Borderline      AGE      Desirable  Optimal    High     High     Very High     0-19 Y     0 - 109     ---    110-129   >/= 130     ----    20-24 Y     0 - 119     ---    120-159   >/= 160     ----      >24 Y     0 -  99   100-129  130-159   160-189     >/=190      CREATININE 1.08 08/16/2024      Lab Results   Component Value Date    CHOL 199 08/16/2024    CHOL 323 (H) 09/26/2023     Lab Results   Component Value Date    HDL 39.1 08/16/2024    HDL 32.5 (A) 09/26/2023       Lab Results   Component Value Date    LDLCALC  08/16/2024      Comment:      The calculation of LDL and VLDL are inaccurate when the Triglycerides are greater than 400 mg/dL or when the patient is non-fasting. If LDL measurement is necessary contact the testing laboratory for an alternative LDL assay.                                  Near   Borderline      AGE      Desirable  Optimal    High     High     Very High     0-19 Y     0 - 109     ---    110-129   >/= 130     ----    20-24 Y     0 - 119     ---    120-159   >/= 160     ----      >24 Y      0 -  99   100-129  130-159   160-189     >/=190       Lab Results   Component Value Date    TRIG 419 (H) 08/16/2024    TRIG 1277 (H) 09/26/2023       Continue all meds under the continuation of care with the referring provider and clinical pharmacy team.    Start Mounjaro 2.5 mg at end of month. Will look to cross-titrate with insulins with goal of Mounjaro monotherapy for DMII. Rx for 5 mg send to patient home pharmacy to see if covered as [employer] test[employer] software not working at this time.

## 2024-10-15 ENCOUNTER — APPOINTMENT (OUTPATIENT)
Dept: PHARMACY | Facility: HOSPITAL | Age: 44
End: 2024-10-15
Payer: COMMERCIAL

## 2024-10-15 DIAGNOSIS — E11.9 TYPE 2 DIABETES MELLITUS WITHOUT COMPLICATION, WITHOUT LONG-TERM CURRENT USE OF INSULIN (MULTI): ICD-10-CM

## 2024-10-15 NOTE — PROGRESS NOTES
Subjective     Patient ID: Carrington Mead is a 44 y.o. male who presents for Follow-up (DMII). Unable to run test claim as ins plan appears not to be contracted with  pharmacies. Contacted patient home pharmacy to verify insurance info and verify that Mounjaro needs PA for approval. Patient verbalized understanding.     Referring Provider: Ronan Montano, DO       No Known Allergies    Objective     Current DM Pharmacotherapy:   Novolog 2 units 2 times daily before meals  Lantus 10 units nightly  Humalog 4 units 2 times daily  Metformin 1,000 mg BID  Mounjaro 2.5 mg weekly (samples)    SECONDARY PREVENTION  - Statin? Yes  - ACE-I/ARB? Yes  - Aspirin? No      Pertinent PMH Review:  - PMH of Pancreatitis: No  - PMH/FH of Medullary Thyroid Cancer: No  - PMH/FH of Multiple Endocrine Neoplasia (MEN) Type II: No  - PMH of Retinopathy: No  - PMH of Urinary Tract Infections/Yeast Infections: No    Patient Goals  - Fasting B - 130 mg/dL  - Postprandial BG: less than 180 mg/dL  - A1c less than 7%    Lab Review  Lab Results   Component Value Date    BILITOT 1.8 (H) 2024    CALCIUM 10.1 2024    CO2 24 2024     2024    CREATININE 1.08 2024    GLUCOSE 166 (H) 2024    ALKPHOS 51 2024    K 4.4 2024    PROT 7.3 2024     2024    AST 76 (H) 2024     (H) 2024    BUN 22 2024    ANIONGAP 14 2024    ALBUMIN 4.5 2024    GFRMALE 60 2023     Lab Results   Component Value Date    TRIG 419 (H) 2024    CHOL 199 2024    LDLCALC  2024      Comment:      The calculation of LDL and VLDL are inaccurate when the Triglycerides are greater than 400 mg/dL or when the patient is non-fasting. If LDL measurement is necessary contact the testing laboratory for an alternative LDL assay.                                  Near   Borderline      AGE      Desirable  Optimal    High     High     Very High     0-19 Y     0 -  "109     ---    110-129   >/= 130     ----    20-24 Y     0 - 119     ---    120-159   >/= 160     ----      >24 Y     0 -  99   100-129  130-159   160-189     >/=190      HDL 39.1 08/16/2024     Lab Results   Component Value Date    HGBA1C 7.9 (H) 08/16/2024     No components found for: \"UACR\"  The 10-year ASCVD risk score (Floyd CARRANZA, et al., 2019) is: 8.2%    Values used to calculate the score:      Age: 44 years      Sex: Male      Is Non- : No      Diabetic: Yes      Tobacco smoker: No      Systolic Blood Pressure: 152 mmHg      Is BP treated: Yes      HDL Cholesterol: 39.1 mg/dL      Total Cholesterol: 199 mg/dL      Assessment/Plan     Problem List Items Addressed This Visit       Type 2 diabetes mellitus    Relevant Orders    Referral to Clinical Pharmacy       Type 2 diabetes mellitus, is not at goal. Goal A1C: <7%    Follow up: I recommend diabetes care be 1 weeks.  Pa Submitted; Share PA determination at follow-up for Mounjaro 5 mg weekly; to discuss cost assistance, if needed at that time    Mark Clemente PharmD [facility]  Clinical Pharmacy Specialist, Primary Care     Continue all meds under the continuation of care with the referring provider and clinic    "

## 2024-10-22 ENCOUNTER — APPOINTMENT (OUTPATIENT)
Dept: PHARMACY | Facility: HOSPITAL | Age: 44
End: 2024-10-22
Payer: COMMERCIAL

## 2024-10-22 DIAGNOSIS — E11.9 TYPE 2 DIABETES MELLITUS WITHOUT COMPLICATION, WITHOUT LONG-TERM CURRENT USE OF INSULIN (MULTI): ICD-10-CM

## 2024-10-22 NOTE — PROGRESS NOTES
Subjective   Patient ID: Carrington Mead is a 44 y.o. male who presents for Follow-up (PA determination). Tolerating Mounjaro 2.5 mg weekly well; vomiting once after eating greasy food but has not has issues since. Down 11 lbs since starting. Has not used insulin recently. No BG's to report today. Last A1c not at goal.     Assessment/Plan   Problem List Items Addressed This Visit       Type 2 diabetes mellitus       LAB RESULTS:  Lab Results   Component Value Date    HGBA1C 7.9 (H) 08/16/2024     Lab Results   Component Value Date    LDLCALC  08/16/2024      Comment:      The calculation of LDL and VLDL are inaccurate when the Triglycerides are greater than 400 mg/dL or when the patient is non-fasting. If LDL measurement is necessary contact the testing laboratory for an alternative LDL assay.                                  Near   Borderline      AGE      Desirable  Optimal    High     High     Very High     0-19 Y     0 - 109     ---    110-129   >/= 130     ----    20-24 Y     0 - 119     ---    120-159   >/= 160     ----      >24 Y     0 -  99   100-129  130-159   160-189     >/=190      CREATININE 1.08 08/16/2024      Lab Results   Component Value Date    CHOL 199 08/16/2024    CHOL 323 (H) 09/26/2023     Lab Results   Component Value Date    HDL 39.1 08/16/2024    HDL 32.5 (A) 09/26/2023       Lab Results   Component Value Date    LDLCALC  08/16/2024      Comment:      The calculation of LDL and VLDL are inaccurate when the Triglycerides are greater than 400 mg/dL or when the patient is non-fasting. If LDL measurement is necessary contact the testing laboratory for an alternative LDL assay.                                  Near   Borderline      AGE      Desirable  Optimal    High     High     Very High     0-19 Y     0 - 109     ---    110-129   >/= 130     ----    20-24 Y     0 - 119     ---    120-159   >/= 160     ----      >24 Y     0 -  99   100-129  130-159   160-189     >/=190       Lab Results    Component Value Date    TRIG 419 (H) 08/16/2024    TRIG 1277 (H) 09/26/2023       Continue all meds under the continuation of care with the referring provider and clinical pharmacy team.    PA approved and copay at $25 per pharmacy technician at patient pharmacy. Patient to start Mounjaro 5 mg weekly and follow-up in 4 weeks. Discontinue Insulins upon starting 5 mg weekly; pt to call this writer for any need prior to follow-up

## 2024-11-19 ENCOUNTER — APPOINTMENT (OUTPATIENT)
Dept: PHARMACY | Facility: HOSPITAL | Age: 44
End: 2024-11-19
Payer: COMMERCIAL

## 2024-11-19 DIAGNOSIS — E11.9 TYPE 2 DIABETES MELLITUS WITHOUT COMPLICATION, WITHOUT LONG-TERM CURRENT USE OF INSULIN (MULTI): ICD-10-CM

## 2024-11-19 RX ORDER — TIRZEPATIDE 7.5 MG/.5ML
7.5 INJECTION, SOLUTION SUBCUTANEOUS
Qty: 2 ML | Refills: 0 | Status: CANCELLED | OUTPATIENT
Start: 2024-11-19

## 2024-11-19 RX ORDER — TIRZEPATIDE 5 MG/.5ML
5 INJECTION, SOLUTION SUBCUTANEOUS
Qty: 2 ML | Refills: 6 | Status: SHIPPED | OUTPATIENT
Start: 2024-11-19

## 2024-11-19 NOTE — PROGRESS NOTES
Subjective     Patient ID: Carrington Mead is a 44 y.o. male who presents for Diabetes. Tolerating well has minor nausea intermittently if he doesn't eat. Sometimes get nausea after what he eats is a poor choice.      Referring Provider: Ronan Montano, DO     HPI    Diet: Caloric Restriction; smaller portions with meals; still eating out a lot while on road.     Exercise: limited since follow-up  Current Weight: 253 lbs  Baseline Weight: 277 lbs  Goal Weight: 220 lbs (highschool weight)    No Known Allergies    Objective     Current DM Pharmacotherapy:   Mounjaro 5 mg weekly    SECONDARY PREVENTION  - Statin? Yes  - ACE-I/ARB? Yes  - Aspirin? No    Pertinent PMH Review:  - PMH of Pancreatitis: No  - PMH/FH of Medullary Thyroid Cancer: No  - PMH/FH of Multiple Endocrine Neoplasia (MEN) Type II: No  - PMH of Retinopathy: No  - PMH of Urinary Tract Infections/Yeast Infections: No    Patient Goals  - Fasting B - 130 mg/dL  - Postprandial BG: less than 180 mg/dL  - A1c less than 7%    Lab Review  Lab Results   Component Value Date    BILITOT 1.8 (H) 2024    CALCIUM 10.1 2024    CO2 24 2024     2024    CREATININE 1.08 2024    GLUCOSE 166 (H) 2024    ALKPHOS 51 2024    K 4.4 2024    PROT 7.3 2024     2024    AST 76 (H) 2024     (H) 2024    BUN 22 2024    ANIONGAP 14 2024    ALBUMIN 4.5 2024    GFRMALE 60 2023     Lab Results   Component Value Date    TRIG 419 (H) 2024    CHOL 199 2024    LDLCALC  2024      Comment:      The calculation of LDL and VLDL are inaccurate when the Triglycerides are greater than 400 mg/dL or when the patient is non-fasting. If LDL measurement is necessary contact the testing laboratory for an alternative LDL assay.                                  Near   Borderline      AGE      Desirable  Optimal    High     High     Very High     0-19 Y     0 - 109     ---     110-129   >/= 130     ----    20-24 Y     0 - 119     ---    120-159   >/= 160     ----      >24 Y     0 -  99   100-129  130-159   160-189     >/=190      HDL 39.1 08/16/2024     Lab Results   Component Value Date    HGBA1C 7.9 (H) 08/16/2024       The 10-year ASCVD risk score (Floyd CARRANZA, et al., 2019) is: 8.2%    Values used to calculate the score:      Age: 44 years      Sex: Male      Is Non- : No      Diabetic: Yes      Tobacco smoker: No      Systolic Blood Pressure: 152 mmHg      Is BP treated: Yes      HDL Cholesterol: 39.1 mg/dL      Total Cholesterol: 199 mg/dL      Assessment/Plan     Problem List Items Addressed This Visit       Type 2 diabetes mellitus       Type 2 diabetes mellitus, is not at goal. Goal A1C: <7%    Follow up: I recommend diabetes care be 12 weeks. 2/11/24 @ 3:30 p  2.   Losing weight within target range and has minor nausea ; will continue for another 12 week of Mounjaro  5 mg weekly  3.   Follow diet/exercise plan as directed  3.   Monitor weight and BG as directed    Mark Clemente PharmD [facility]  Clinical Pharmacy Specialist, Primary Care     Continue all meds under the continuation of care with the referring provider and clinic

## 2024-12-07 DIAGNOSIS — E11.9 TYPE 2 DIABETES MELLITUS WITHOUT COMPLICATION, WITHOUT LONG-TERM CURRENT USE OF INSULIN (MULTI): ICD-10-CM

## 2024-12-07 DIAGNOSIS — E78.2 MIXED HYPERLIPIDEMIA: ICD-10-CM

## 2024-12-09 RX ORDER — PITAVASTATIN CALCIUM 2.09 MG/1
1 TABLET, FILM COATED ORAL DAILY
Qty: 30 TABLET | Refills: 3 | Status: SHIPPED | OUTPATIENT
Start: 2024-12-09

## 2024-12-09 RX ORDER — METFORMIN HYDROCHLORIDE 1000 MG/1
1000 TABLET ORAL 2 TIMES DAILY
Qty: 60 TABLET | Refills: 3 | Status: SHIPPED | OUTPATIENT
Start: 2024-12-09

## 2024-12-24 DIAGNOSIS — Z00.00 HEALTHCARE MAINTENANCE: Primary | ICD-10-CM

## 2024-12-24 DIAGNOSIS — Z12.11 ENCOUNTER FOR SCREENING FOR MALIGNANT NEOPLASM OF COLON: ICD-10-CM

## 2025-01-13 ENCOUNTER — TELEPHONE (OUTPATIENT)
Dept: PHARMACY | Facility: HOSPITAL | Age: 45
End: 2025-01-13
Payer: COMMERCIAL

## 2025-01-13 ENCOUNTER — PHARMACY VISIT (OUTPATIENT)
Dept: PHARMACY | Facility: CLINIC | Age: 45
End: 2025-01-13
Payer: COMMERCIAL

## 2025-01-13 DIAGNOSIS — E11.9 TYPE 2 DIABETES MELLITUS WITHOUT COMPLICATION, WITHOUT LONG-TERM CURRENT USE OF INSULIN (MULTI): Primary | ICD-10-CM

## 2025-01-13 DIAGNOSIS — E11.9 TYPE 2 DIABETES MELLITUS WITHOUT COMPLICATION, WITHOUT LONG-TERM CURRENT USE OF INSULIN (MULTI): ICD-10-CM

## 2025-01-13 PROCEDURE — RXMED WILLOW AMBULATORY MEDICATION CHARGE

## 2025-01-13 RX ORDER — TIRZEPATIDE 5 MG/.5ML
5 INJECTION, SOLUTION SUBCUTANEOUS
Qty: 2 ML | Refills: 2 | Status: SHIPPED | OUTPATIENT
Start: 2025-01-13

## 2025-01-13 RX ORDER — TIRZEPATIDE 5 MG/.5ML
5 INJECTION, SOLUTION SUBCUTANEOUS
Qty: 2 ML | Refills: 2 | Status: SHIPPED | OUTPATIENT
Start: 2025-01-13 | End: 2025-01-13 | Stop reason: SDUPTHER

## 2025-01-13 NOTE — TELEPHONE ENCOUNTER
Reorder Mounjaro 5 mg to [facility]. Patient is out of medication (next dose due 1/15) & leaving for trip (1/21). Coupon savings apply to deductible.    Problem List Items Addressed This Visit       Type 2 diabetes mellitus - Primary    Relevant Medications    tirzepatide (Mounjaro) 5 mg/0.5 mL pen injector

## 2025-01-28 DIAGNOSIS — Z00.00 HEALTHCARE MAINTENANCE: ICD-10-CM

## 2025-01-28 RX ORDER — LEVOTHYROXINE SODIUM 125 UG/1
TABLET ORAL
Qty: 90 TABLET | Refills: 2 | Status: SHIPPED | OUTPATIENT
Start: 2025-01-28

## 2025-02-03 PROCEDURE — RXMED WILLOW AMBULATORY MEDICATION CHARGE

## 2025-02-05 ENCOUNTER — PHARMACY VISIT (OUTPATIENT)
Dept: PHARMACY | Facility: CLINIC | Age: 45
End: 2025-02-05
Payer: COMMERCIAL

## 2025-02-05 PROCEDURE — RXMED WILLOW AMBULATORY MEDICATION CHARGE

## 2025-02-08 ENCOUNTER — PHARMACY VISIT (OUTPATIENT)
Dept: PHARMACY | Facility: CLINIC | Age: 45
End: 2025-02-08
Payer: COMMERCIAL

## 2025-02-11 ENCOUNTER — APPOINTMENT (OUTPATIENT)
Dept: PHARMACY | Facility: HOSPITAL | Age: 45
End: 2025-02-11
Payer: COMMERCIAL

## 2025-02-11 DIAGNOSIS — E11.9 TYPE 2 DIABETES MELLITUS WITHOUT COMPLICATION, WITHOUT LONG-TERM CURRENT USE OF INSULIN (MULTI): ICD-10-CM

## 2025-02-11 RX ORDER — TIRZEPATIDE 5 MG/.5ML
5 INJECTION, SOLUTION SUBCUTANEOUS
Qty: 2 ML | Refills: 2 | Status: SHIPPED | OUTPATIENT
Start: 2025-02-11

## 2025-02-11 NOTE — PATIENT INSTRUCTIONS
Eating tips to reduce nausea and unwanted GI side effects from Ozempic and Mounjaro   Eat slowly  Eat smaller portions  Avoid laying down right after meal  Stop eating when feeling full  Avoid drinking from straw  Stay hydrated, drink small amounts of water throughout the day  Avoid strenuous exercise after eating  Avoid eating close too bedtime    Meal planning tips  Avoid fried foods, and high fat meals  Focus on bland foods  Choose water rich foods like soups, kefir, protein drinks/smoothies     Lifestyle suggestions  Keep a food/symptom diary, as it may be helpful to identify meal timing of foods that make nausea worse.  Engage in light exercise (walking), get fresh air    Additional tips for alleviating nausea:  Wait at least 30 minutes after GLP1-RA dose to eat, if feeling nauseated try crackers, apples, mint, omar root or omar tea  Avoid strong smells    If vomiting:  Eat smaller amounts of food in more frequent meals  Stay hydrated, but avoid drinks during meals, wait 30-60 minutes before and after meals to have liquids    If having diarrhea:  Generous hydration, i.e: water with lemon and tsp baking soda  Avoid dairy products, laxatives, coffee, alcoholic beverages, soft drinks, very cold or very hot foods, products that contain sugar alcohols (sorbitol, mannitol, xylitol, maltitol), including gum and candy  Avoid (or temporarily reduce your intake of) foods with high fiber content such as grain and seed products, whole grain breads, artichokes, asparagus, beans, cabbage, lentils, mushrooms, onions, garlic, sugar snap peas, skinned fruits, apples, apricots, blackberries, cherries, cody, nectarine, pears, plum  Eat chicken broth, rice, carrots, very ripe fruit without skin  Gradually increase fiber back in when symptoms improve    If having constipation:  Ensure the amount of fiber in diet is adequate (goal is minimum of 25 grams per day).  Increase physical activity  Drink generous amounts of  water

## 2025-02-11 NOTE — PROGRESS NOTES
Pharmacist Clinic: Weight Management    Carrington Mead was referred to the Clinical Pharmacy Team for weight management. Patient endorses constipation for 1-2 days after injection and then turns to diarrhea. Reports this is non severe like when he took Ozempic. Says he feels the best he has felt in a long time.     Referring Provider: Ronan Montano, DO  Problem List Items Addressed This Visit       Type 2 diabetes mellitus       HISTORY OF PRESENT ILLNESS    Diet: no changes - not watching what he eats but eating smaller portions - notices he is able to eat a bit more lately.     Exercise Routine: none and not interested at this time.     Current Weight:  246 lbs    BG's 100-120 mg/dL    PHARMACY ASSESSMENT    Allergies: Patient has no known allergies.     [facility] [address] [place], [place] - [address]  [address] [facility] [address]  Phone: [phone] Fax: [phone]    [facility]  [address]  [facility] [address]  Phone: [phone] Fax: [phone]      No issues reported in regards to accessibility, affordability, adherence, or organization    CURRENT PHARMACOTHERAPY  - Mounjaro 5 mg weekly  - Metformin 1,000 mg BID     DRUG INTERACTIONS  - No significant drug-drug interactions exist that require adjustment to therapy    LAB  Lab Results   Component Value Date    BILITOT 1.8 (H) 08/16/2024    CALCIUM 10.1 08/16/2024    CO2 24 08/16/2024     08/16/2024    CREATININE 1.08 08/16/2024    GLUCOSE 166 (H) 08/16/2024    ALKPHOS 51 08/16/2024    K 4.4 08/16/2024    PROT 7.3 08/16/2024     08/16/2024    AST 76 (H) 08/16/2024     (H) 08/16/2024    BUN 22 08/16/2024    ANIONGAP 14 08/16/2024    ALBUMIN 4.5 08/16/2024    EGFR 86 08/16/2024     Lab Results   Component Value Date    TRIG 419 (H) 08/16/2024    CHOL 199 08/16/2024    LDLCALC  08/16/2024      Comment:      The calculation of LDL and VLDL are inaccurate when the Triglycerides are greater than  400 mg/dL or when the patient is non-fasting. If LDL measurement is necessary contact the testing laboratory for an alternative LDL assay.                                  Near   Borderline      AGE      Desirable  Optimal    High     High     Very High     0-19 Y     0 - 109     ---    110-129   >/= 130     ----    20-24 Y     0 - 119     ---    120-159   >/= 160     ----      >24 Y     0 -  99   100-129  130-159   160-189     >/=190      HDL 39.1 08/16/2024     Lab Results   Component Value Date    HGBA1C 7.9 (H) 08/16/2024       Current Outpatient Medications on File Prior to Visit   Medication Sig Dispense Refill    levothyroxine (Synthroid, Levoxyl) 125 mcg tablet Take 1 tablet by mouth once daily 90 tablet 2    lisinopril 40 mg tablet Take 1 tablet (40 mg) by mouth once daily. 90 tablet 1    metFORMIN (Glucophage) 1,000 mg tablet Take 1 tablet by mouth twice daily 60 tablet 1    nystatin (Mycostatin) 100,000 unit/mL suspension Take 5 mL (500,000 Units) by mouth 4 times a day. Swish in mouth and swallow. (Patient not taking: Reported on 8/27/2024) 200 mL 0    pitavastatin calcium 2 mg tablet Take 1 tablet by mouth once daily 30 tablet 1    tirzepatide (Mounjaro) 5 mg/0.5 mL pen injector Inject 5 mg under the skin every 7 days. 2 mL 2     No current facility-administered medications on file prior to visit.       PATIENT EDUCATION/GOALS  - Eating tips to reduce nausea and unwanted GI side effects from Ozempic and Mounjaro   Eat slowly  Eat smaller portions  Avoid laying down right after meal  Stop eating when feeling full  Avoid drinking from straw  Stay hydrated, drink small amounts of water throughout the day  Avoid strenuous exercise after eating  Avoid eating close too bedtime    Meal planning tips  Avoid fried foods, and high fat meals  Focus on bland foods  Choose water rich foods like soups, kefir, protein drinks/smoothies     Lifestyle suggestions  Keep a food/symptom diary, as it may be helpful to  identify meal timing of foods that make nausea worse.  Engage in light exercise (walking), get fresh air    Additional tips for alleviating nausea:  Wait at least 30 minutes after GLP1-RA dose to eat, if feeling nauseated try crackers, apples, mint, omar root or omar tea  Avoid strong smells    If vomiting:  Eat smaller amounts of food in more frequent meals  Stay hydrated, but avoid drinks during meals, wait 30-60 minutes before and after meals to have liquids    If having diarrhea:  Generous hydration, i.e: water with lemon and tsp baking soda  Avoid dairy products, laxatives, coffee, alcoholic beverages, soft drinks, very cold or very hot foods, products that contain sugar alcohols (sorbitol, mannitol, xylitol, maltitol), including gum and candy  Avoid (or temporarily reduce your intake of) foods with high fiber content such as grain and seed products, whole grain breads, artichokes, asparagus, beans, cabbage, lentils, mushrooms, onions, garlic, sugar snap peas, skinned fruits, apples, apricots, blackberries, cherries, cody, nectarine, pears, plum  Eat chicken broth, rice, carrots, very ripe fruit without skin  Gradually increase fiber back in when symptoms improve    If having constipation:  Ensure the amount of fiber in diet is adequate (goal is minimum of 25 grams per day).  Increase physical activity  Drink generous amounts of water    Mounjaro Education:     Counseled patient on Mounjaro MOA, expectations, side effects, duration of therapy, administration, and monitoring parameters.  Provided detailed dosing and administration counseling to ensure proper technique.   Reviewed Mounjaro titration schedule, starting with 2.5 mg once weekly to a goal of 15 mg once weekly if tolerated  Counseled patient on the benefits of GLP-1ra glycemic control and weight loss  Reviewed storage requirements of Mounjaro when not in use, and when to administer the medication if a dose is missed.  Advised patient that they  may experience improved satiety after meals and portion sizes of meals may be reduced as doses of Mounjaro increase.      - Counseled patient on MOA, expectations, side effects, duration of therapy, contraindications, administration, and monitoring parameters  - Answered all patient questions and concerns; provided [facility] phone number if issues/questions arise    RECOMMENDATIONS/PLAN  1. Continue Mounjaro 5 mg weekly  2. Cut metformin to 1,000 mg daily  3. Consider increase at follow-up --will see if weight loss continues and patient tolerating dose better.     Clinical Pharmacist follow up: 4 weeks 3/11/25 @ 3:20  Type of Encounter: telephone    Continue all meds under the continuation of care with the referring provider and clinical pharmacy team.    Thank you,  Mark Clemente, PharmD     Verbal consent to manage patient's drug therapy was obtained from patient. They were informed they may decline to participate or withdraw from participation in pharmacy services at any time.

## 2025-02-27 PROCEDURE — RXMED WILLOW AMBULATORY MEDICATION CHARGE

## 2025-03-03 ENCOUNTER — PHARMACY VISIT (OUTPATIENT)
Dept: PHARMACY | Facility: CLINIC | Age: 45
End: 2025-03-03
Payer: COMMERCIAL

## 2025-03-11 ENCOUNTER — APPOINTMENT (OUTPATIENT)
Dept: PHARMACY | Facility: HOSPITAL | Age: 45
End: 2025-03-11
Payer: COMMERCIAL

## 2025-03-11 DIAGNOSIS — E11.9 TYPE 2 DIABETES MELLITUS WITHOUT COMPLICATION, WITHOUT LONG-TERM CURRENT USE OF INSULIN (MULTI): ICD-10-CM

## 2025-03-11 NOTE — PROGRESS NOTES
Pharmacist Clinic: Weight Management     Carrington Mead was referred to the Clinical Pharmacy Team for weight management. Patient endorses continued constipation for 1-2 days after injection and then turns to diarrhea; although improved-not as much stomach cramps - cut meformin in half at last visit. Reports this is non severe like when he took Ozempic. Continues to feel good overall.      Referring Provider: Ronan Montano, DO  Problem List Items Addressed This Visit         Type 2 diabetes mellitus         HISTORY OF PRESENT ILLNESS     Diet: no changes - not watching what he eats but eating smaller portions - notices he is able to eat a bit more lately.      Exercise Routine: none and not interested at this time.      Current Weight:      2/11/25: 246 lbs     BG's 100-120 mg/dL     PHARMACY ASSESSMENT     Allergies: Patient has no known allergies.      [facility] [address] [place], [place] - [address]  [address] [facility] [address]  Phone: [phone] Fax: [phone]     [facility]  [address]  [facility] [address]  Phone: [phone] Fax: [phone]        No issues reported in regards to accessibility, affordability, adherence, or organization     CURRENT PHARMACOTHERAPY  - Mounjaro 5 mg weekly  - Metformin 1,000 mg daily      DRUG INTERACTIONS  - No significant drug-drug interactions exist that require adjustment to therapy     LAB        Lab Results   Component Value Date     BILITOT 1.8 (H) 08/16/2024     CALCIUM 10.1 08/16/2024     CO2 24 08/16/2024      08/16/2024     CREATININE 1.08 08/16/2024     GLUCOSE 166 (H) 08/16/2024     ALKPHOS 51 08/16/2024     K 4.4 08/16/2024     PROT 7.3 08/16/2024      08/16/2024     AST 76 (H) 08/16/2024      (H) 08/16/2024     BUN 22 08/16/2024     ANIONGAP 14 08/16/2024     ALBUMIN 4.5 08/16/2024     EGFR 86 08/16/2024              Lab Results   Component Value Date     TRIG 419 (H) 08/16/2024     CHOL 199  08/16/2024     LDLCALC   08/16/2024         Comment:         The calculation of LDL and VLDL are inaccurate when the Triglycerides are greater than 400 mg/dL or when the patient is non-fasting. If LDL measurement is necessary contact the testing laboratory for an alternative LDL assay.                                     Near   Borderline      AGE      Desirable  Optimal    High     High     Very High     0-19 Y     0 - 109     ---    110-129   >/= 130     ----    20-24 Y     0 - 119     ---    120-159   >/= 160     ----      >24 Y     0 -  99   100-129  130-159   160-189     >/=190        HDL 39.1 08/16/2024            Lab Results   Component Value Date     HGBA1C 7.9 (H) 08/16/2024         Medications Ordered Prior to Encounter          Current Outpatient Medications on File Prior to Visit   Medication Sig Dispense Refill    levothyroxine (Synthroid, Levoxyl) 125 mcg tablet Take 1 tablet by mouth once daily 90 tablet 2    lisinopril 40 mg tablet Take 1 tablet (40 mg) by mouth once daily. 90 tablet 1    metFORMIN (Glucophage) 1,000 mg tablet Take 1 tablet by mouth twice daily 60 tablet 1    nystatin (Mycostatin) 100,000 unit/mL suspension Take 5 mL (500,000 Units) by mouth 4 times a day. Swish in mouth and swallow. (Patient not taking: Reported on 8/27/2024) 200 mL 0    pitavastatin calcium 2 mg tablet Take 1 tablet by mouth once daily 30 tablet 1    tirzepatide (Mounjaro) 5 mg/0.5 mL pen injector Inject 5 mg under the skin every 7 days. 2 mL 2      No current facility-administered medications on file prior to visit.            PATIENT EDUCATION/GOALS  - Eating tips to reduce nausea and unwanted GI side effects from Ozempic and Mounjaro   Eat slowly  Eat smaller portions  Avoid laying down right after meal  Stop eating when feeling full  Avoid drinking from straw  Stay hydrated, drink small amounts of water throughout the day  Avoid strenuous exercise after eating  Avoid eating close too bedtime     Meal planning  tips  Avoid fried foods, and high fat meals  Focus on bland foods  Choose water rich foods like soups, kefir, protein drinks/smoothies      Lifestyle suggestions  Keep a food/symptom diary, as it may be helpful to identify meal timing of foods that make nausea worse.  Engage in light exercise (walking), get fresh air     Additional tips for alleviating nausea:  Wait at least 30 minutes after GLP1-RA dose to eat, if feeling nauseated try crackers, apples, mint, omar root or omar tea  Avoid strong smells     If vomiting:  Eat smaller amounts of food in more frequent meals  Stay hydrated, but avoid drinks during meals, wait 30-60 minutes before and after meals to have liquids     If having diarrhea:  Generous hydration, i.e: water with lemon and tsp baking soda  Avoid dairy products, laxatives, coffee, alcoholic beverages, soft drinks, very cold or very hot foods, products that contain sugar alcohols (sorbitol, mannitol, xylitol, maltitol), including gum and candy  Avoid (or temporarily reduce your intake of) foods with high fiber content such as grain and seed products, whole grain breads, artichokes, asparagus, beans, cabbage, lentils, mushrooms, onions, garlic, sugar snap peas, skinned fruits, apples, apricots, blackberries, cherries, cody, nectarine, pears, plum  Eat chicken broth, rice, carrots, very ripe fruit without skin  Gradually increase fiber back in when symptoms improve     If having constipation:  Ensure the amount of fiber in diet is adequate (goal is minimum of 25 grams per day).  Increase physical activity  Drink generous amounts of water     Mounjaro Education:      Counseled patient on Mounjaro MOA, expectations, side effects, duration of therapy, administration, and monitoring parameters.  Provided detailed dosing and administration counseling to ensure proper technique.   Reviewed Mounjaro titration schedule, starting with 2.5 mg once weekly to a goal of 15 mg once weekly if  tolerated  Counseled patient on the benefits of GLP-1ra glycemic control and weight loss  Reviewed storage requirements of Mounjaro when not in use, and when to administer the medication if a dose is missed.  Advised patient that they may experience improved satiety after meals and portion sizes of meals may be reduced as doses of Mounjaro increase.        - Counseled patient on MOA, expectations, side effects, duration of therapy, contraindications, administration, and monitoring parameters  - Answered all patient questions and concerns; provided [facility] phone number if issues/questions arise     RECOMMENDATIONS/PLAN  1. Continue Mounjaro to  5 mg weekly  2. discontinue metformin to 1,000 mg daily  3. Implement more fiber to diet along with high protein intake; exercise as tolerated.      Clinical Pharmacist follow up: 4 weeks 4/8/25 @ 3:20p  Type of Encounter: telephone     Continue all meds under the continuation of care with the referring provider and clinical pharmacy team.     Thank you,  Mark Clemente, PharmD      Verbal consent to manage patient's drug therapy was obtained from patient. They were informed they may decline to participate or withdraw from participation in pharmacy services at any time.

## 2025-03-17 PROCEDURE — RXMED WILLOW AMBULATORY MEDICATION CHARGE

## 2025-03-20 ENCOUNTER — PHARMACY VISIT (OUTPATIENT)
Dept: PHARMACY | Facility: CLINIC | Age: 45
End: 2025-03-20
Payer: COMMERCIAL

## 2025-03-26 PROCEDURE — RXMED WILLOW AMBULATORY MEDICATION CHARGE

## 2025-04-02 ENCOUNTER — PHARMACY VISIT (OUTPATIENT)
Dept: PHARMACY | Facility: CLINIC | Age: 45
End: 2025-04-02
Payer: COMMERCIAL

## 2025-04-08 ENCOUNTER — APPOINTMENT (OUTPATIENT)
Dept: PHARMACY | Facility: HOSPITAL | Age: 45
End: 2025-04-08
Payer: COMMERCIAL

## 2025-04-14 DIAGNOSIS — I10 HYPERTENSION, UNSPECIFIED TYPE: ICD-10-CM

## 2025-04-14 PROCEDURE — RXMED WILLOW AMBULATORY MEDICATION CHARGE

## 2025-04-15 PROCEDURE — RXMED WILLOW AMBULATORY MEDICATION CHARGE

## 2025-04-15 RX ORDER — LISINOPRIL 40 MG/1
40 TABLET ORAL DAILY
Qty: 90 TABLET | Refills: 1 | Status: SHIPPED | OUTPATIENT
Start: 2025-04-15

## 2025-04-17 ENCOUNTER — APPOINTMENT (OUTPATIENT)
Dept: PHARMACY | Facility: HOSPITAL | Age: 45
End: 2025-04-17
Payer: COMMERCIAL

## 2025-04-17 ENCOUNTER — PHARMACY VISIT (OUTPATIENT)
Dept: PHARMACY | Facility: CLINIC | Age: 45
End: 2025-04-17
Payer: COMMERCIAL

## 2025-04-17 DIAGNOSIS — E11.9 TYPE 2 DIABETES MELLITUS WITHOUT COMPLICATION, WITHOUT LONG-TERM CURRENT USE OF INSULIN: ICD-10-CM

## 2025-04-17 PROCEDURE — RXMED WILLOW AMBULATORY MEDICATION CHARGE

## 2025-04-17 RX ORDER — TIRZEPATIDE 5 MG/.5ML
5 INJECTION, SOLUTION SUBCUTANEOUS
Qty: 2 ML | Refills: 2 | Status: SHIPPED | OUTPATIENT
Start: 2025-04-17

## 2025-04-17 NOTE — PROGRESS NOTES
Pharmacist Clinic: Weight Management    Carrington Mead was referred to the Clinical Pharmacy Team for weight management. Better toleration of Mounjaro; side effects when eating poorly. Has been traveling a lot (6 of last 8 weeks) and has impacted lifestyle and food choices. Patient would like to lose more weight and feels he is at a plateau.    Referring Provider: Ronan Montano, DO  Problem List Items Addressed This Visit    None      HISTORY OF PRESENT ILLNESS    Diet: no changes - not watching what he eats but eating smaller portions - notices he is able to eat a bit more lately.      Exercise Routine: none and not interested at this time.      Current Weight:  none to report ; patient says no weight loss     2/11/25: 246 lbs     BG's 100-120 mg/dL    PHARMACY ASSESSMENT    Allergies: Patient has no known allergies.     [facility] [address] [place] [address] [address] [facility]  [address] [facility] [address]  Phone: [phone] Fax: [phone]    [facility]  [address] [address]  Phone: [phone] Fax: [phone]    No issues reported in regards to [accessibility, affordability, adherence, adverse effects, or organization]    CURRENT PHARMACOTHERAPY  - Mounjaro 5 mg weekly     DRUG INTERACTIONS  - No significant drug-drug interactions exist that require adjustment to therapy    LAB  Lab Results   Component Value Date    BILITOT 1.8 (H) 08/16/2024    CALCIUM 10.1 08/16/2024    CO2 24 08/16/2024     08/16/2024    CREATININE 1.08 08/16/2024    GLUCOSE 166 (H) 08/16/2024    ALKPHOS 51 08/16/2024    K 4.4 08/16/2024    PROT 7.3 08/16/2024     08/16/2024    AST 76 (H) 08/16/2024     (H) 08/16/2024    BUN 22 08/16/2024    ANIONGAP 14 08/16/2024    ALBUMIN 4.5 08/16/2024    EGFR 86 08/16/2024     Lab Results   Component Value Date    TRIG 419 (H) 08/16/2024    CHOL 199 08/16/2024    LDLCALC  08/16/2024      Comment:      The calculation of LDL and VLDL  are inaccurate when the Triglycerides are greater than 400 mg/dL or when the patient is non-fasting. If LDL measurement is necessary contact the testing laboratory for an alternative LDL assay.                                  Near   Borderline      AGE      Desirable  Optimal    High     High     Very High     0-19 Y     0 - 109     ---    110-129   >/= 130     ----    20-24 Y     0 - 119     ---    120-159   >/= 160     ----      >24 Y     0 -  99   100-129  130-159   160-189     >/=190      HDL 39.1 08/16/2024     Lab Results   Component Value Date    HGBA1C 7.9 (H) 08/16/2024       Medications Ordered Prior to Encounter[1]    PATIENT EDUCATION/GOALS  - Eating tips to reduce nausea and unwanted GI side effects from Ozempic and Mounjaro   Eat slowly  Eat smaller portions  Avoid laying down right after meal  Stop eating when feeling full  Avoid drinking from straw  Stay hydrated, drink small amounts of water throughout the day  Avoid strenuous exercise after eating  Avoid eating close too bedtime    Meal planning tips  Avoid fried foods, and high fat meals  Focus on bland foods  Choose water rich foods like soups, kefir, protein drinks/smoothies     Lifestyle suggestions  Keep a food/symptom diary, as it may be helpful to identify meal timing of foods that make nausea worse.  Engage in light exercise (walking), get fresh air    Additional tips for alleviating nausea:  Wait at least 30 minutes after GLP1-RA dose to eat, if feeling nauseated try crackers, apples, mint, omar root or omar tea  Avoid strong smells    If vomiting:  Eat smaller amounts of food in more frequent meals  Stay hydrated, but avoid drinks during meals, wait 30-60 minutes before and after meals to have liquids    If having diarrhea:  Generous hydration, i.e: water with lemon and tsp baking soda  Avoid dairy products, laxatives, coffee, alcoholic beverages, soft drinks, very cold or very hot foods, products that contain sugar alcohols  (sorbitol, mannitol, xylitol, maltitol), including gum and candy  Avoid (or temporarily reduce your intake of) foods with high fiber content such as grain and seed products, whole grain breads, artichokes, asparagus, beans, cabbage, lentils, mushrooms, onions, garlic, sugar snap peas, skinned fruits, apples, apricots, blackberries, cherries, cody, nectarine, pears, plum  Eat chicken broth, rice, carrots, very ripe fruit without skin  Gradually increase fiber back in when symptoms improve    If having constipation:  Ensure the amount of fiber in diet is adequate (goal is minimum of 25 grams per day).  Increase physical activity  Drink generous amounts of water         - Counseled patient on MOA, expectations, side effects, duration of therapy, contraindications, administration, and monitoring parameters  - Answered all patient questions and concerns; provided [facility] phone number if issues/questions arise    RECOMMENDATIONS/PLAN  1. Last pcp visit 8 mo prior; follow-up with PCP prior to dose change-patient's says next week; continue Mounjaro 5 mg weekly and metformin 2 gm/day; if A1c and weight appropriate can consider increase in mounjaro and/or decrease in metformin. Indicated to increase with weight but would prefer to see labwork  2. Continue to work towards diet/exercise plan of 500kcal caloric restriction diet with moderate protein, adequate fiber and at least 150 minutes of aerobic exercise weekly  3. Follow-up 3 mo with goal of pt seeing PCP prior to follow-up.     Clinical Pharmacist follow up: 7/24/25 @4pm  Type of Encounter: telephone    Continue all meds under the continuation of care with the referring provider and clinical pharmacy team.    Thank you,  Mark Clemente, PharmD     Verbal consent to manage patient's drug therapy was obtained from patient. They were informed they may decline to participate or withdraw from participation in pharmacy services at any time.                    [1]   Current  Outpatient Medications on File Prior to Visit   Medication Sig Dispense Refill    levothyroxine (Synthroid, Levoxyl) 125 mcg tablet Take 1 tablet by mouth once daily 90 tablet 2    lisinopril 40 mg tablet Take 1 tablet (40 mg) by mouth once daily. 90 tablet 1    metFORMIN (Glucophage) 1,000 mg tablet Take 1 tablet by mouth twice daily 60 tablet 1    nystatin (Mycostatin) 100,000 unit/mL suspension Take 5 mL (500,000 Units) by mouth 4 times a day. Swish in mouth and swallow. (Patient not taking: Reported on 8/27/2024) 200 mL 0    pitavastatin calcium 2 mg tablet Take 1 tablet by mouth once daily 30 tablet 1    tirzepatide (Mounjaro) 5 mg/0.5 mL pen injector Inject 5 mg under the skin every 7 days. 2 mL 2    [DISCONTINUED] lisinopril 40 mg tablet Take 1 tablet (40 mg) by mouth once daily. 90 tablet 1     No current facility-administered medications on file prior to visit.

## 2025-04-23 ENCOUNTER — PHARMACY VISIT (OUTPATIENT)
Dept: PHARMACY | Facility: CLINIC | Age: 45
End: 2025-04-23
Payer: COMMERCIAL

## 2025-04-24 DIAGNOSIS — E11.9 TYPE 2 DIABETES MELLITUS WITHOUT COMPLICATION, WITHOUT LONG-TERM CURRENT USE OF INSULIN: ICD-10-CM

## 2025-04-24 DIAGNOSIS — E03.9 HYPOTHYROIDISM, UNSPECIFIED TYPE: ICD-10-CM

## 2025-04-24 DIAGNOSIS — Z00.00 HEALTHCARE MAINTENANCE: Primary | ICD-10-CM

## 2025-04-24 DIAGNOSIS — I10 HYPERTENSION, UNSPECIFIED TYPE: ICD-10-CM

## 2025-04-24 DIAGNOSIS — Z13.220 LIPID SCREENING: ICD-10-CM

## 2025-04-25 LAB
ALBUMIN SERPL-MCNC: 4.6 G/DL (ref 3.6–5.1)
ALBUMIN/CREAT UR: 1230 MG/G CREAT
ALP SERPL-CCNC: 45 U/L (ref 36–130)
ALT SERPL-CCNC: 71 U/L (ref 9–46)
ANION GAP SERPL CALCULATED.4IONS-SCNC: 12 MMOL/L (CALC) (ref 7–17)
AST SERPL-CCNC: 43 U/L (ref 10–40)
BILIRUB SERPL-MCNC: 1.5 MG/DL (ref 0.2–1.2)
BUN SERPL-MCNC: 20 MG/DL (ref 7–25)
CALCIUM SERPL-MCNC: 10.1 MG/DL (ref 8.6–10.3)
CHLORIDE SERPL-SCNC: 103 MMOL/L (ref 98–110)
CHOLEST SERPL-MCNC: 222 MG/DL
CHOLEST/HDLC SERPL: 4.9 (CALC)
CO2 SERPL-SCNC: 24 MMOL/L (ref 20–32)
CREAT SERPL-MCNC: 1.2 MG/DL (ref 0.6–1.29)
CREAT UR-MCNC: 130 MG/DL (ref 20–320)
EGFRCR SERPLBLD CKD-EPI 2021: 76 ML/MIN/1.73M2
ERYTHROCYTE [DISTWIDTH] IN BLOOD BY AUTOMATED COUNT: 13.1 % (ref 11–15)
EST. AVERAGE GLUCOSE BLD GHB EST-MCNC: 131 MG/DL
EST. AVERAGE GLUCOSE BLD GHB EST-SCNC: 7.3 MMOL/L
GLUCOSE SERPL-MCNC: 106 MG/DL (ref 65–99)
HBA1C MFR BLD: 6.2 %
HCT VFR BLD AUTO: 49.4 % (ref 38.5–50)
HDLC SERPL-MCNC: 45 MG/DL
HGB BLD-MCNC: 16.4 G/DL (ref 13.2–17.1)
LDLC SERPL CALC-MCNC: 133 MG/DL (CALC)
MCH RBC QN AUTO: 30 PG (ref 27–33)
MCHC RBC AUTO-ENTMCNC: 33.2 G/DL (ref 32–36)
MCV RBC AUTO: 90.5 FL (ref 80–100)
MICROALBUMIN UR-MCNC: 159.9 MG/DL
NONHDLC SERPL-MCNC: 177 MG/DL (CALC)
PLATELET # BLD AUTO: 274 THOUSAND/UL (ref 140–400)
PMV BLD REES-ECKER: 9.8 FL (ref 7.5–12.5)
POTASSIUM SERPL-SCNC: 4.2 MMOL/L (ref 3.5–5.3)
PROT SERPL-MCNC: 7.4 G/DL (ref 6.1–8.1)
RBC # BLD AUTO: 5.46 MILLION/UL (ref 4.2–5.8)
SODIUM SERPL-SCNC: 139 MMOL/L (ref 135–146)
TRIGL SERPL-MCNC: 289 MG/DL
TSH SERPL-ACNC: 2.96 MIU/L (ref 0.4–4.5)
WBC # BLD AUTO: 9.4 THOUSAND/UL (ref 3.8–10.8)

## 2025-04-25 NOTE — RESULT ENCOUNTER NOTE
Sg your hemoglobin A1c is 6.2.  This is really good your liver enzymes have came down  We can discuss your cholesterol at your visit  The blood counts look good thyroid test is in the normal range so continue the medications

## 2025-05-13 ENCOUNTER — APPOINTMENT (OUTPATIENT)
Dept: PRIMARY CARE | Facility: CLINIC | Age: 45
End: 2025-05-13
Payer: COMMERCIAL

## 2025-05-13 VITALS
DIASTOLIC BLOOD PRESSURE: 108 MMHG | TEMPERATURE: 97.7 F | SYSTOLIC BLOOD PRESSURE: 152 MMHG | WEIGHT: 255 LBS | HEART RATE: 92 BPM | BODY MASS INDEX: 35.57 KG/M2 | OXYGEN SATURATION: 96 % | RESPIRATION RATE: 16 BRPM

## 2025-05-13 DIAGNOSIS — E11.9 TYPE 2 DIABETES MELLITUS WITHOUT COMPLICATION, WITHOUT LONG-TERM CURRENT USE OF INSULIN: ICD-10-CM

## 2025-05-13 DIAGNOSIS — I10 PRIMARY HYPERTENSION: ICD-10-CM

## 2025-05-13 DIAGNOSIS — E78.2 MIXED HYPERLIPIDEMIA: ICD-10-CM

## 2025-05-13 DIAGNOSIS — Z00.00 HEALTHCARE MAINTENANCE: Primary | ICD-10-CM

## 2025-05-13 PROCEDURE — 99396 PREV VISIT EST AGE 40-64: CPT | Performed by: FAMILY MEDICINE

## 2025-05-13 PROCEDURE — RXMED WILLOW AMBULATORY MEDICATION CHARGE

## 2025-05-13 PROCEDURE — 3080F DIAST BP >= 90 MM HG: CPT | Performed by: FAMILY MEDICINE

## 2025-05-13 PROCEDURE — 4010F ACE/ARB THERAPY RXD/TAKEN: CPT | Performed by: FAMILY MEDICINE

## 2025-05-13 PROCEDURE — 3077F SYST BP >= 140 MM HG: CPT | Performed by: FAMILY MEDICINE

## 2025-05-13 RX ORDER — AMLODIPINE BESYLATE 2.5 MG/1
2.5 TABLET ORAL DAILY
Qty: 30 TABLET | Refills: 2 | Status: SHIPPED | OUTPATIENT
Start: 2025-05-13 | End: 2025-11-09

## 2025-05-13 RX ORDER — TIRZEPATIDE 7.5 MG/.5ML
7.5 INJECTION, SOLUTION SUBCUTANEOUS WEEKLY
Qty: 2 ML | Refills: 1 | Status: SHIPPED | OUTPATIENT
Start: 2025-05-13

## 2025-05-13 ASSESSMENT — ENCOUNTER SYMPTOMS
DIARRHEA: 0
DIZZINESS: 0
EYES NEGATIVE: 1
CONSTIPATION: 0
HEADACHES: 1
ENDOCRINE NEGATIVE: 1
NUMBNESS: 0
GASTROINTESTINAL NEGATIVE: 1
WEAKNESS: 0
ABDOMINAL PAIN: 0
SHORTNESS OF BREATH: 0
WHEEZING: 0
FEVER: 0
VOMITING: 0
COUGH: 0

## 2025-05-13 NOTE — PROGRESS NOTES
Subjective   Patient ID: Carrington Mead is a 44 y.o. male who presents for Follow-up.    Overall patient is doing okay.  No chest pain or shortness of breath.  He is tolerating his medication.  Normal bowel bladder.  Patient states he is not having the side effects he did with his Ozempic.  Think his glucose numbers are better.  He has been getting some headaches.  He is notices blood pressure readings have been running higher.  He has been take his medications.  No side effects.  He is lost weight he has not increased his physical activity yet.  But he is eating better.  Tolerating his medications,         Review of Systems   Constitutional:  Negative for fever.   HENT: Negative.     Eyes: Negative.    Respiratory:  Negative for cough, shortness of breath and wheezing.    Cardiovascular:  Negative for chest pain and leg swelling.   Gastrointestinal: Negative.  Negative for abdominal pain, constipation, diarrhea and vomiting.   Endocrine: Negative.    Genitourinary: Negative.    Skin:  Negative for rash.   Neurological:  Positive for headaches. Negative for dizziness, weakness and numbness.       Objective   BP (!) 152/108 (BP Location: Right arm, Patient Position: Sitting, BP Cuff Size: Large adult)   Pulse 92   Temp 36.5 °C (97.7 °F)   Resp 16   Wt 116 kg (255 lb)   SpO2 96%   BMI 35.57 kg/m²     Physical Exam  Vitals and nursing note reviewed.   Constitutional:       General: He is not in acute distress.     Appearance: Normal appearance. He is well-developed. He is not ill-appearing.   HENT:      Head: Normocephalic.      Right Ear: Tympanic membrane and ear canal normal.      Left Ear: Tympanic membrane and ear canal normal.      Nose: Nose normal.      Mouth/Throat:      Mouth: Mucous membranes are moist.      Pharynx: Oropharynx is clear.   Eyes:      Extraocular Movements: Extraocular movements intact.      Conjunctiva/sclera: Conjunctivae normal.      Pupils: Pupils are equal, round, and reactive to  light.   Cardiovascular:      Rate and Rhythm: Normal rate and regular rhythm.      Pulses: Normal pulses.      Heart sounds: Normal heart sounds.   Pulmonary:      Effort: Pulmonary effort is normal. No respiratory distress.      Breath sounds: Normal breath sounds. No wheezing, rhonchi or rales.   Abdominal:      General: Bowel sounds are normal.      Palpations: Abdomen is soft.      Tenderness: There is no guarding.      Hernia: No hernia is present.   Musculoskeletal:         General: Normal range of motion.      Cervical back: Neck supple.      Right lower leg: No edema.      Left lower leg: No edema.   Skin:     General: Skin is warm.      Capillary Refill: Capillary refill takes less than 2 seconds.      Findings: No rash.   Neurological:      General: No focal deficit present.      Mental Status: He is alert and oriented to person, place, and time.      Cranial Nerves: No cranial nerve deficit.      Sensory: No sensory deficit.      Gait: Gait normal.      Deep Tendon Reflexes: Reflexes normal.   Psychiatric:         Mood and Affect: Mood normal.         Behavior: Behavior normal.         Judgment: Judgment normal.         Assessment/Plan   Problem List Items Addressed This Visit       Hypertension    Relevant Medications    amLODIPine (Norvasc) 2.5 mg tablet    Type 2 diabetes mellitus    Relevant Medications    tirzepatide (Mounjaro) 7.5 mg/0.5 mL pen injector     Other Visit Diagnoses         Healthcare maintenance    -  Primary         Repeat was 144/86.  Will add amlodipine.  That should hopefully control his blood pressure better.  Patient needs to get his Cologuard test done.  If not we will have to order a colonoscopy.  Patient is aware of the side effects of medication  If any chest pain shortness of breath any nausea vomiting or fever headache any concerning symptom go to ER  Will increase patient's dose.  He had started the metformin however we can hold this and continue to monitor his  numbers  Follow-up in 1 month for blood pressure check.  Patient's check his blood pressure through the week and let me know

## 2025-05-14 ENCOUNTER — PHARMACY VISIT (OUTPATIENT)
Dept: PHARMACY | Facility: CLINIC | Age: 45
End: 2025-05-14
Payer: COMMERCIAL

## 2025-05-14 PROCEDURE — RXMED WILLOW AMBULATORY MEDICATION CHARGE

## 2025-05-14 RX ORDER — PITAVASTATIN CALCIUM 2.09 MG/1
1 TABLET, FILM COATED ORAL DAILY
Qty: 30 TABLET | Refills: 5 | Status: SHIPPED | OUTPATIENT
Start: 2025-05-14

## 2025-05-15 ENCOUNTER — PHARMACY VISIT (OUTPATIENT)
Dept: PHARMACY | Facility: CLINIC | Age: 45
End: 2025-05-15
Payer: COMMERCIAL

## 2025-06-06 PROCEDURE — RXMED WILLOW AMBULATORY MEDICATION CHARGE

## 2025-06-11 ENCOUNTER — PHARMACY VISIT (OUTPATIENT)
Dept: PHARMACY | Facility: CLINIC | Age: 45
End: 2025-06-11
Payer: COMMERCIAL

## 2025-06-24 PROCEDURE — RXMED WILLOW AMBULATORY MEDICATION CHARGE

## 2025-06-30 ENCOUNTER — PHARMACY VISIT (OUTPATIENT)
Dept: PHARMACY | Facility: CLINIC | Age: 45
End: 2025-06-30
Payer: COMMERCIAL

## 2025-07-07 PROCEDURE — RXMED WILLOW AMBULATORY MEDICATION CHARGE

## 2025-07-08 ENCOUNTER — PHARMACY VISIT (OUTPATIENT)
Dept: PHARMACY | Facility: CLINIC | Age: 45
End: 2025-07-08
Payer: COMMERCIAL

## 2025-07-14 DIAGNOSIS — K76.0 FATTY LIVER: ICD-10-CM

## 2025-07-14 DIAGNOSIS — I10 PRIMARY HYPERTENSION: ICD-10-CM

## 2025-07-14 DIAGNOSIS — E11.9 TYPE 2 DIABETES MELLITUS WITHOUT COMPLICATION, WITHOUT LONG-TERM CURRENT USE OF INSULIN: ICD-10-CM

## 2025-07-14 DIAGNOSIS — I10 PRIMARY HYPERTENSION: Primary | ICD-10-CM

## 2025-07-14 PROCEDURE — RXMED WILLOW AMBULATORY MEDICATION CHARGE

## 2025-07-14 RX ORDER — TIRZEPATIDE 10 MG/.5ML
10 INJECTION, SOLUTION SUBCUTANEOUS WEEKLY
Qty: 2 ML | Refills: 2 | Status: SHIPPED | OUTPATIENT
Start: 2025-07-14 | End: 2025-07-14 | Stop reason: SDUPTHER

## 2025-07-14 RX ORDER — TIRZEPATIDE 10 MG/.5ML
10 INJECTION, SOLUTION SUBCUTANEOUS WEEKLY
Qty: 2 ML | Refills: 2 | Status: SHIPPED | OUTPATIENT
Start: 2025-07-14

## 2025-07-15 ENCOUNTER — OFFICE VISIT (OUTPATIENT)
Dept: PRIMARY CARE | Facility: CLINIC | Age: 45
End: 2025-07-15
Payer: COMMERCIAL

## 2025-07-15 ENCOUNTER — CLINICAL SUPPORT (OUTPATIENT)
Dept: PRIMARY CARE | Facility: CLINIC | Age: 45
End: 2025-07-15
Payer: COMMERCIAL

## 2025-07-15 VITALS
OXYGEN SATURATION: 97 % | DIASTOLIC BLOOD PRESSURE: 100 MMHG | HEART RATE: 88 BPM | SYSTOLIC BLOOD PRESSURE: 142 MMHG | RESPIRATION RATE: 16 BRPM

## 2025-07-15 DIAGNOSIS — I10 HYPERTENSION, UNSPECIFIED TYPE: ICD-10-CM

## 2025-07-15 DIAGNOSIS — E11.9 TYPE 2 DIABETES MELLITUS WITHOUT COMPLICATION, WITHOUT LONG-TERM CURRENT USE OF INSULIN: Primary | ICD-10-CM

## 2025-07-15 DIAGNOSIS — K76.0 FATTY LIVER: ICD-10-CM

## 2025-07-15 NOTE — PROGRESS NOTES
Pts BP is still high pt is feeling fine. Dr Morel spoke to pt.    Patient will increase the amlodipine to 5 mg daily.  He is to buy blood pressure cuff.  Will see if this helps.  Will get already increase his diabetes meds.  If his blood pressure is controlled we will send a new refill.we may need to add another medication

## 2025-07-16 ENCOUNTER — PHARMACY VISIT (OUTPATIENT)
Dept: PHARMACY | Facility: CLINIC | Age: 45
End: 2025-07-16
Payer: COMMERCIAL

## 2025-07-17 ENCOUNTER — PHARMACY VISIT (OUTPATIENT)
Dept: PHARMACY | Facility: CLINIC | Age: 45
End: 2025-07-17
Payer: COMMERCIAL

## 2025-07-22 DIAGNOSIS — I10 PRIMARY HYPERTENSION: ICD-10-CM

## 2025-07-22 PROCEDURE — RXMED WILLOW AMBULATORY MEDICATION CHARGE

## 2025-07-22 RX ORDER — AMLODIPINE BESYLATE 5 MG/1
5 TABLET ORAL DAILY
Qty: 90 TABLET | Refills: 1 | Status: SHIPPED | OUTPATIENT
Start: 2025-07-22 | End: 2026-01-18

## 2025-07-24 ENCOUNTER — APPOINTMENT (OUTPATIENT)
Dept: PHARMACY | Facility: HOSPITAL | Age: 45
End: 2025-07-24
Payer: COMMERCIAL

## 2025-07-24 ENCOUNTER — PHARMACY VISIT (OUTPATIENT)
Dept: PHARMACY | Facility: CLINIC | Age: 45
End: 2025-07-24
Payer: COMMERCIAL

## 2025-07-24 DIAGNOSIS — E11.9 TYPE 2 DIABETES MELLITUS WITHOUT COMPLICATION, WITHOUT LONG-TERM CURRENT USE OF INSULIN: Primary | ICD-10-CM

## 2025-07-24 NOTE — PROGRESS NOTES
Subjective     Patient ID: Carrington Mead is a 45 y.o. male who presents for No chief complaint on file..    Referring Provider: Ronan Montano, DO     Tolerating  Mounjaro 10 mg weekly well; does report intermittent constipation.     Current Weight 252 lbs      Diet: small portion sizes    Exercise: not assessed    Allergies[1]    Objective     Current DM Pharmacotherapy:   Mounjaro 10 mg weekly    Patient Goals  - Fasting B - 130 mg/dL  - Postprandial BG: less than 180 mg/dL  - A1c less than 7%    Lab Review  Lab Results   Component Value Date    BILITOT 1.5 (H) 2025    CALCIUM 10.1 2025    CO2 24 2025     2025    CREATININE 1.20 2025    GLUCOSE 106 (H) 2025    ALKPHOS 45 2025    K 4.2 2025    PROT 7.4 2025     2025    AST 43 (H) 2025    ALT 71 (H) 2025    BUN 20 2025    ANIONGAP 12 2025    ALBUMIN 4.6 2025    GFRMALE 60 2023     Lab Results   Component Value Date    TRIG 289 (H) 2025    CHOL 222 (H) 2025    LDLCALC 133 (H) 2025    HDL 45 2025     Lab Results   Component Value Date    HGBA1C 6.2 (H) 2025       Assessment/Plan     Problem List Items Addressed This Visit    None      Type 2 diabetes mellitus, is at goal. Goal A1C: <7% A1c outstanding     Follow up: I recommend diabetes care be 8 weeks. 25 4pm  Continue Mounjaro 10 mg weekly  2.   Recommended Omron BP cuff for BP monitoring at home    Mark TongD [facility]  Clinical Pharmacy Specialist, Primary Care     Continue all meds under the continuation of care with the referring provider and clinic          [1] No Known Allergies

## 2025-08-06 PROCEDURE — RXMED WILLOW AMBULATORY MEDICATION CHARGE

## 2025-08-09 ENCOUNTER — PHARMACY VISIT (OUTPATIENT)
Dept: PHARMACY | Facility: CLINIC | Age: 45
End: 2025-08-09
Payer: COMMERCIAL

## 2025-08-11 ENCOUNTER — PHARMACY VISIT (OUTPATIENT)
Dept: PHARMACY | Facility: CLINIC | Age: 45
End: 2025-08-11
Payer: COMMERCIAL

## 2025-09-04 PROCEDURE — RXMED WILLOW AMBULATORY MEDICATION CHARGE

## 2025-09-06 ENCOUNTER — PHARMACY VISIT (OUTPATIENT)
Dept: PHARMACY | Facility: CLINIC | Age: 45
End: 2025-09-06
Payer: COMMERCIAL

## 2025-09-18 ENCOUNTER — APPOINTMENT (OUTPATIENT)
Dept: PHARMACY | Facility: HOSPITAL | Age: 45
End: 2025-09-18
Payer: COMMERCIAL